# Patient Record
Sex: MALE | Race: WHITE | NOT HISPANIC OR LATINO | Employment: FULL TIME | URBAN - METROPOLITAN AREA
[De-identification: names, ages, dates, MRNs, and addresses within clinical notes are randomized per-mention and may not be internally consistent; named-entity substitution may affect disease eponyms.]

---

## 2019-11-08 ENCOUNTER — TELEPHONE (OUTPATIENT)
Dept: GASTROENTEROLOGY | Facility: CLINIC | Age: 34
End: 2019-11-08

## 2019-11-08 ENCOUNTER — OFFICE VISIT (OUTPATIENT)
Dept: GASTROENTEROLOGY | Facility: CLINIC | Age: 34
End: 2019-11-08
Payer: COMMERCIAL

## 2019-11-08 VITALS
BODY MASS INDEX: 32.07 KG/M2 | SYSTOLIC BLOOD PRESSURE: 150 MMHG | WEIGHT: 224 LBS | DIASTOLIC BLOOD PRESSURE: 100 MMHG | HEART RATE: 100 BPM | HEIGHT: 70 IN

## 2019-11-08 DIAGNOSIS — R10.33 PERIUMBILICAL ABDOMINAL PAIN: ICD-10-CM

## 2019-11-08 DIAGNOSIS — R14.0 BLOATING: ICD-10-CM

## 2019-11-08 DIAGNOSIS — K21.9 GASTROESOPHAGEAL REFLUX DISEASE, ESOPHAGITIS PRESENCE NOT SPECIFIED: ICD-10-CM

## 2019-11-08 DIAGNOSIS — R10.13 EPIGASTRIC PAIN: Primary | ICD-10-CM

## 2019-11-08 PROCEDURE — 99204 OFFICE O/P NEW MOD 45 MIN: CPT | Performed by: NURSE PRACTITIONER

## 2019-11-08 RX ORDER — LISINOPRIL 10 MG/1
10 TABLET ORAL DAILY
COMMUNITY
End: 2021-11-15 | Stop reason: SDUPTHER

## 2019-11-08 RX ORDER — FAMOTIDINE 20 MG/1
40 TABLET, FILM COATED ORAL DAILY
Qty: 30 TABLET | Refills: 2 | Status: SHIPPED | OUTPATIENT
Start: 2019-11-08 | End: 2021-04-14

## 2019-11-08 NOTE — PROGRESS NOTES
[facility] - Outpatient Consultation  Luis Fernando Cline 36 y o  male MRN: [id number]  Encounter: [id number]    ASSESSMENT AND PLAN:      1  Epigastric pain  Postprandial episodic epigastric pain that has progressed over the past several weeks  Exclude peptic ulcer disease, H pylori gastritis or biliary disease     - will arrange for an upper endoscopy  - famotidine (PEPCID) 20 mg tablet; Take 2 tablets (40 mg total) by mouth daily  Dispense: 30 tablet; Refill: 2    2  Periumbilical abdominal pain  Intermittent episodes of postprandial periumbilical abdominal pain with associated abdominal bloating over the past year and a half  Possibilities include peptic ulcer disease, biliary disease or less likely pancreatitis  Consider small intestine bacterial overgrowth  - if EGD negative for an explanation will order blood work to include CBC, CMP, amylase and lipase in addition to an abdominal ultrasound versus a CT scan of the abdomen      4  Gastroesophageal reflux disease, esophagitis presence not specified  Longstanding GERD for the past 3-4 years typically controlled on Zantac on an as-needed basis  However, he since has discontinued this medication several days ago  Exclude the presence of Curtis's esophagus, esophagitis or gastritis  - Pepcid 40 mg daily  - decrease consumption of ascitic, spicy, fried, fatty foods and caffeinated beverages  - will arrange for an upper endoscopy    Followup Appointment:  4 weeks  ______________________________________________________________________    Chief Complaint   Patient presents with    referred by family member     heartburn, bloating- feels better with eating        HPI:   Luis Fernando Cline is a 36y o  year old male who presents episodic issues with postprandial epigastric, periumbilical abdominal pain with associated bloating for the past 1 and half years    This most recent episode has been ongoing for the past several weeks and has progressed in intensity  Pain can last anywhere from a few hours to a few days  Occurs few hours after eating  Alleviated while lying flat on his back  Chronic heartburn for the past few years that had been alleviated with Zantac on an as-needed basis  The patient recently took himself off the Zantac  This has not necessary worsened is heartburn  Has gained 19 lb over the past 3 months  Reports that his stomach feels hard at times  Denies constipation or diarrhea  Denies melena rectal bleeding  Historical Information   Past Medical History:   Diagnosis Date    GERD (gastroesophageal reflux disease)     Hypertension      Past Surgical History:   Procedure Laterality Date    APPENDECTOMY       Social History     Substance and Sexual Activity   Alcohol Use Not Currently     Social History     Substance and Sexual Activity   Drug Use Not on file     Social History     Tobacco Use   Smoking Status Current Every Day Smoker    Packs/day: 1 50    Start date: 11/8/2001   Smokeless Tobacco Former User     Family History   Problem Relation Age of Onset    No Known Problems Mother     Heart disease Father     No Known Problems Brother        Meds/Allergies     Current Outpatient Medications:     lisinopril (ZESTRIL) 10 mg tablet    famotidine (PEPCID) 20 mg tablet    Allergies   Allergen Reactions    Ibuprofen     Tetracycline        PHYSICAL EXAM:    Pulse 100, height 5' 10" (1 778 m), weight 102 kg (224 lb)  Body mass index is 32 14 kg/m²  General Appearance: NAD, cooperative, alert  Eyes: Anicteric, PERRLA, EOMI  ENT:  Normocephalic, atraumatic, normal mucosa  Neck:  Supple, symmetrical, trachea midline,   Resp:  Clear to auscultation bilaterally; no rales, rhonchi or wheezing; respirations unlabored   CV:  S1 S2, Regular rate and rhythm; no murmur, rub, or gallop    GI:  Soft, non-tender, non-distended; normal bowel sounds; no masses, no organomegaly   Rectal: Deferred  Musculoskeletal: No cyanosis, clubbing or edema  Normal ROM  Skin:  No jaundice, rashes, or lesions   Heme/Lymph: No palpable cervical lymphadenopathy  Psych: Normal affect, good eye contact  Neuro: No gross deficits, AAOx3    Lab Results:   No results found for: WBC, HGB, HCT, MCV, PLT  No results found for: NA, K, CL, CO2, ANIONGAP, BUN, CREATININE, GLUCOSE, GLUF, CALCIUM, CORRECTEDCA, AST, ALT, ALKPHOS, PROT, BILITOT, EGFR  No results found for: IRON, TIBC, FERRITIN  No results found for: LIPASE    Radiology Results:   No results found  REVIEW OF SYSTEMS:    CONSTITUTIONAL: Denies any fever, chills, rigors, and weight loss  Positive weight gain  HEENT: No earache or tinnitus  Denies hearing loss or visual disturbances  CARDIOVASCULAR: No chest pain or palpitations  RESPIRATORY: Denies any cough, hemoptysis, shortness of breath or dyspnea on exertion  GASTROINTESTINAL: As noted in the History of Present Illness  GENITOURINARY: No problems with urination  Denies any hematuria or dysuria  NEUROLOGIC: No dizziness or vertigo, denies headaches  MUSCULOSKELETAL: Denies any muscle or joint pain  SKIN: Denies skin rashes or itching  ENDOCRINE: Denies excessive thirst  Denies intolerance to heat or cold  PSYCHOSOCIAL: Denies depression or anxiety  Denies any recent memory loss

## 2019-11-08 NOTE — PATIENT INSTRUCTIONS
Pepcid 40 mg daily  Will arrange for an upper endoscopy  If upper endoscopy negative for any abnormalities will pursue blood work and imaging studies

## 2019-11-13 DIAGNOSIS — K21.00 GASTROESOPHAGEAL REFLUX DISEASE WITH ESOPHAGITIS: Primary | ICD-10-CM

## 2019-11-13 PROCEDURE — 88305 TISSUE EXAM BY PATHOLOGIST: CPT | Performed by: PATHOLOGY

## 2019-11-13 PROCEDURE — 88342 IMHCHEM/IMCYTCHM 1ST ANTB: CPT | Performed by: PATHOLOGY

## 2019-11-13 RX ORDER — PANTOPRAZOLE SODIUM 40 MG/1
40 TABLET, DELAYED RELEASE ORAL DAILY
Qty: 30 TABLET | Refills: 1
Start: 2019-11-13 | End: 2020-01-10 | Stop reason: SDUPTHER

## 2019-11-13 NOTE — TELEPHONE ENCOUNTER
Pt had a procedure today and was given a script for Pantoprazole 40 mg daily, #30 with 1 refill  This is for documentation only

## 2019-11-14 ENCOUNTER — LAB REQUISITION (OUTPATIENT)
Dept: LAB | Facility: HOSPITAL | Age: 34
End: 2019-11-14
Payer: COMMERCIAL

## 2019-11-14 DIAGNOSIS — K20.90 ESOPHAGITIS, UNSPECIFIED: ICD-10-CM

## 2019-11-14 DIAGNOSIS — K21.9 GASTRO-ESOPHAGEAL REFLUX DISEASE WITHOUT ESOPHAGITIS: ICD-10-CM

## 2019-11-14 DIAGNOSIS — R10.13 EPIGASTRIC PAIN: ICD-10-CM

## 2019-11-21 ENCOUNTER — TELEPHONE (OUTPATIENT)
Dept: GASTROENTEROLOGY | Facility: CLINIC | Age: 34
End: 2019-11-21

## 2019-11-21 NOTE — TELEPHONE ENCOUNTER
Pts wife, Venu Munson, would like KK/Nursing to call her cell once egd path is reviewed, if pt does not answer his phone

## 2019-11-27 NOTE — TELEPHONE ENCOUNTER
Pt's wife, Lavell Uriarte, left [facility] [place] [place] asking for CB w/ bx of endo from 2 wks ago; asks for CB to his cell [phone] or to her [phone]

## 2019-11-29 NOTE — TELEPHONE ENCOUNTER
Discussed with wife  Patient is feeling better overall and Protonix  I explained that if symptoms flare he could add Pepcid at bedtime  He will keep his office visit next week

## 2020-01-10 DIAGNOSIS — K21.00 GASTROESOPHAGEAL REFLUX DISEASE WITH ESOPHAGITIS: ICD-10-CM

## 2020-01-10 RX ORDER — PANTOPRAZOLE SODIUM 40 MG/1
40 TABLET, DELAYED RELEASE ORAL DAILY
Qty: 30 TABLET | Refills: 5 | Status: SHIPPED | OUTPATIENT
Start: 2020-01-10 | End: 2020-04-26

## 2020-01-10 NOTE — TELEPHONE ENCOUNTER
Received a refill request from [facility] to refill pt's Pantoprazole 40 mg daily  Pt did have a procedure in November which noted to continue PPI with a 6 month EGD recall  Entered for your review and signature

## 2020-02-06 ENCOUNTER — TELEPHONE (OUTPATIENT)
Dept: GASTROENTEROLOGY | Facility: CLINIC | Age: 35
End: 2020-02-06

## 2020-02-06 NOTE — TELEPHONE ENCOUNTER
Pt's wife Alcira left  mssg stating over the last 2-3 days he has had a lot of pain in his stomach; knows that originally they said depending on results maybe would do a CT scan; asks for CB/questions if any new tests should be ordered?  [phone] (part of mssg not heard/child in background)

## 2020-02-06 NOTE — TELEPHONE ENCOUNTER
Spoke with patient and scheduled appt for tomorrow afternoon to discuss  Was also due for EGD f/u and had to cancel 2 appts

## 2020-02-07 ENCOUNTER — OFFICE VISIT (OUTPATIENT)
Dept: GASTROENTEROLOGY | Facility: CLINIC | Age: 35
End: 2020-02-07
Payer: COMMERCIAL

## 2020-02-07 ENCOUNTER — TELEPHONE (OUTPATIENT)
Dept: GASTROENTEROLOGY | Facility: CLINIC | Age: 35
End: 2020-02-07

## 2020-02-07 VITALS
BODY MASS INDEX: 31.64 KG/M2 | HEART RATE: 96 BPM | HEIGHT: 70 IN | SYSTOLIC BLOOD PRESSURE: 138 MMHG | WEIGHT: 221 LBS | DIASTOLIC BLOOD PRESSURE: 88 MMHG

## 2020-02-07 DIAGNOSIS — K22.711 BARRETT'S ESOPHAGUS WITH HIGH GRADE DYSPLASIA: ICD-10-CM

## 2020-02-07 DIAGNOSIS — K21.00 GASTROESOPHAGEAL REFLUX DISEASE WITH ESOPHAGITIS: ICD-10-CM

## 2020-02-07 DIAGNOSIS — R10.33 PERIUMBILICAL ABDOMINAL PAIN: Primary | ICD-10-CM

## 2020-02-07 DIAGNOSIS — R14.0 BLOATING: ICD-10-CM

## 2020-02-07 PROCEDURE — 99214 OFFICE O/P EST MOD 30 MIN: CPT | Performed by: NURSE PRACTITIONER

## 2020-02-07 NOTE — PATIENT INSTRUCTIONS
Check labs at [facility]   Ultrasound abdomen at [address]   Repeat Endoscopy at [address]  for now  Continue Protonix 40 mg daily     Follow up in 4-6 weeks        Gastroesophageal Reflux Disease   WHAT YOU NEED TO KNOW:   Gastroesophageal reflux occurs when acid and food in the stomach back up into the esophagus  Gastroesophageal reflux disease (GERD) is reflux that occurs more than twice a week for a few weeks  It usually causes heartburn and other symptoms  GERD can cause other health problems over time if it is not treated  DISCHARGE INSTRUCTIONS:   Return to the emergency department if:   · You feel full and cannot burp or vomit  · You have severe chest pain and sudden trouble breathing  · Your bowel movements are black, bloody, or tarry-looking  · Your vomit looks like coffee grounds or has blood in it  Contact your healthcare provider if:   · You vomit large amounts, or you vomit often  · You have trouble breathing after you vomit  · You have trouble swallowing, or pain with swallowing  · You are losing weight without trying  · Your symptoms get worse or do not improve with treatment  · You have questions or concerns about your condition or care  Medicines:   · Medicines  are used to decrease stomach acid  Medicine may also be used to help your lower esophageal sphincter and stomach contract (tighten) more  · Take your medicine as directed  Contact your healthcare provider if you think your medicine is not helping or if you have side effects  Tell him of her if you are allergic to any medicine  Keep a list of the medicines, vitamins, and herbs you take  Include the amounts, and when and why you take them  Bring the list or the pill bottles to follow-up visits  Carry your medicine list with you in case of an emergency  Manage GERD:   · Do not have foods or drinks that may increase heartburn    These include chocolate, peppermint, fried or fatty foods, drinks that contain caffeine, or carbonated drinks (soda)  Other foods include spicy foods, onions, tomatoes, and tomato-based foods  Do not have foods or drinks that can irritate your esophagus, such as citrus fruits, juices, and alcohol  · Do not eat large meals  When you eat a lot of food at one time, your stomach needs more acid to digest it  Eat 6 small meals each day instead of 3 large ones, and eat slowly  Do not eat meals 2 to 3 hours before bedtime  · Elevate the head of your bed  Place 6-inch blocks under the head of your bed frame  You may also use more than one pillow under your head and shoulders while you sleep  · Maintain a healthy weight  If you are overweight, weight loss may help relieve symptoms of GERD  · Do not smoke  Smoking weakens the lower esophageal sphincter and increases the risk of GERD  Ask your healthcare provider for information if you currently smoke and need help to quit  E-cigarettes or smokeless tobacco still contain nicotine  Talk to your healthcare provider before you use these products  · Do not wear clothing that is tight around your waist   Tight clothing can put pressure on your stomach and cause or worsen GERD symptoms  Follow up with your healthcare provider as directed:  Write down your questions so you remember to ask them during your visits  © 2017 [address] Information is for End User's use only and may not be sold, redistributed or otherwise used for commercial purposes  All illustrations and images included in CareNotes® are the copyrighted property of A D A M , Inc  or Juan Carlos Mcmullen  The above information is an  only  It is not intended as medical advice for individual conditions or treatments  Talk to your doctor, nurse or pharmacist before following any medical regimen to see if it is safe and effective for you

## 2020-02-07 NOTE — TELEPHONE ENCOUNTER
Patient was seen on 02/07/20 and an EGD was ordered  Patient was scheduled for 02/10/20, with Dr Eyal Mckinney, at the [facility]

## 2020-02-07 NOTE — PROGRESS NOTES
[address] [place] [address] [facility] - Outpatient Follow-up Note  Juvencio Childs 36 y o  male MRN: [id number]  Encounter: [id number]    ASSESSMENT AND PLAN:      1  Curtis's esophagus with high grade dysplasia  Last EGD was on 11/14/2019  This showed esophagitis and non-erosive gastritis and at the GE junction, focal high-grade dysplasia and intestinal metaplasia  This is consistent with Curtis's esophagus  A 6 month recall EGD was advised at that time      -will repeat EGD now at [address] [place] [address]   -if there is still evidence of high-grade dysplasia on EGD, will recommend that patient go to an [facility] for radiofrequency ablation  -continue Protonix 40 mg daily   -Patient is a 1 5 pack per day smoker (since age 14)  I  emphasized the importance of smoking cessation with patient  Recommended to try either Nicorette patch or gum and if not successful contact PCP for other alternatives  2  Gastroesophageal reflux disease with esophagitis  Reflux symptoms are well controlled on daily PPI  -continue Protonix 40 mg daily  -GERD lifestyle modifications discussed with patient  -encouraged smoking cessation     3  Periumbilical abdominal pain  4  Bloating  Has had postprandial periumbilical abdominal pain and bloating for the past 4 days  Differential diagnoses include atypical biliary disease       - will order US abdomen complete; Future  - will order Comprehensive metabolic panel; CBC and differential,  Amylase; Lipase   -encouraged bland diet for now       Followup Appointment: 4-6 weeks   ______________________________________________________________________    Chief Complaint   Patient presents with    Epigastric Pain     burning and gnawing sensation in stomach    Bloated     HPI:  Juvencio Childs is a 36y o  year old male who presents to the office today for worsening periumbilical abdominal pain and bloating   He had similar symptoms in November 2019 when he was seen here in our office and had been doing well up until 4 days ago  He states that pain is constant and is described as a burning sensation  He rates pain 8/10 and is associated with bloating that is worse throughout the day  Pain is also worse with palpation and postprandial   He denies any symptoms of heartburn, reflux, dysphagia, early satiety, nausea, vomiting, change in bowel habits, melena, rectal bleeding  He reports that his appetite is good and his weight is stable  (has gained several pounds over the past few weeks, unsure how much)  Historical Information   Past Medical History:   Diagnosis Date    GERD (gastroesophageal reflux disease)     Hypertension      Past Surgical History:   Procedure Laterality Date    APPENDECTOMY      UPPER GASTROINTESTINAL ENDOSCOPY  11/14/2019    esophagitis, non erosive gastritis, +focal high grade dysplasia and intestinal metaplasia  repeat EGD in 6 months      Social History     Substance and Sexual Activity   Alcohol Use Not Currently     Social History     Substance and Sexual Activity   Drug Use Not on file     Social History     Tobacco Use   Smoking Status Current Every Day Smoker    Packs/day: 1 50    Types: Cigarettes    Start date: 11/8/2001   Smokeless Tobacco Former User     Family History   Problem Relation Age of Onset    No Known Problems Mother     Heart disease Father     No Known Problems Brother     Colon cancer Neg Hx     Colon polyps Neg Hx          Current Outpatient Medications:     lisinopril (ZESTRIL) 10 mg tablet    pantoprazole (PROTONIX) 40 mg tablet    famotidine (PEPCID) 20 mg tablet  Allergies   Allergen Reactions    Ibuprofen     Tetracycline      Reviewed medications and allergies and updated as indicated    PHYSICAL EXAM:    Blood pressure 138/88, pulse 96, height 5' 10" (1 778 m), weight 100 kg (221 lb)  Body mass index is 31 71 kg/m²    General Appearance: NAD, cooperative, alert  Eyes: Anicteric, PERRLA, EOMI  ENT:  Normocephalic, atraumatic, normal mucosa  Neck:  Supple, symmetrical, trachea midline  Resp:  Clear to auscultation bilaterally; no rales, rhonchi or wheezing; respirations unlabored   CV:  S1 S2, Regular rate and rhythm; no murmur, rub, or gallop  GI:  Soft, mild left periumbilical ttp, no guarding, no rebound, non-distended; normal bowel sounds; no masses, no organomegaly   Rectal: Deferred  Musculoskeletal: No cyanosis, clubbing or edema  Normal ROM  Skin:  No jaundice, rashes, or lesions   Heme/Lymph: No palpable cervical lymphadenopathy  Psych: Normal affect, good eye contact  Neuro: No gross deficits, AAOx3    Lab Results:   No results found for: WBC, HGB, HCT, MCV, PLT  No results found for: NA, K, CL, CO2, ANIONGAP, BUN, CREATININE, GLUCOSE, GLUF, CALCIUM, CORRECTEDCA, AST, ALT, ALKPHOS, PROT, BILITOT, EGFR  No results found for: IRON, TIBC, FERRITIN  No results found for: LIPASE    Radiology Results:   No results found

## 2020-02-08 LAB
ALBUMIN SERPL-MCNC: 4.9 G/DL (ref 4–5)
ALBUMIN/GLOB SERPL: 2.5 {RATIO} (ref 1.2–2.2)
ALP SERPL-CCNC: 49 IU/L (ref 39–117)
ALT SERPL-CCNC: 23 IU/L (ref 0–44)
AMYLASE SERPL-CCNC: 59 U/L (ref 31–110)
AST SERPL-CCNC: 24 IU/L (ref 0–40)
BASOPHILS # BLD AUTO: 0.1 X10E3/UL (ref 0–0.2)
BASOPHILS NFR BLD AUTO: 1 %
BILIRUB SERPL-MCNC: <0.2 MG/DL (ref 0–1.2)
BUN SERPL-MCNC: 17 MG/DL (ref 6–20)
BUN/CREAT SERPL: 17 (ref 9–20)
CALCIUM SERPL-MCNC: 9.6 MG/DL (ref 8.7–10.2)
CHLORIDE SERPL-SCNC: 101 MMOL/L (ref 96–106)
CO2 SERPL-SCNC: 22 MMOL/L (ref 20–29)
CREAT SERPL-MCNC: 0.98 MG/DL (ref 0.76–1.27)
EOSINOPHIL # BLD AUTO: 0.3 X10E3/UL (ref 0–0.4)
EOSINOPHIL NFR BLD AUTO: 3 %
ERYTHROCYTE [DISTWIDTH] IN BLOOD BY AUTOMATED COUNT: 13 % (ref 11.6–15.4)
GLOBULIN SER-MCNC: 2 G/DL (ref 1.5–4.5)
GLUCOSE SERPL-MCNC: 92 MG/DL (ref 65–99)
HCT VFR BLD AUTO: 44.8 % (ref 37.5–51)
HGB BLD-MCNC: 15.5 G/DL (ref 13–17.7)
IMM GRANULOCYTES # BLD: 0 X10E3/UL (ref 0–0.1)
IMM GRANULOCYTES NFR BLD: 0 %
LIPASE SERPL-CCNC: 45 U/L (ref 13–78)
LYMPHOCYTES # BLD AUTO: 3.8 X10E3/UL (ref 0.7–3.1)
LYMPHOCYTES NFR BLD AUTO: 42 %
MCH RBC QN AUTO: 32.1 PG (ref 26.6–33)
MCHC RBC AUTO-ENTMCNC: 34.6 G/DL (ref 31.5–35.7)
MCV RBC AUTO: 93 FL (ref 79–97)
MONOCYTES # BLD AUTO: 0.4 X10E3/UL (ref 0.1–0.9)
MONOCYTES NFR BLD AUTO: 5 %
NEUTROPHILS # BLD AUTO: 4.4 X10E3/UL (ref 1.4–7)
NEUTROPHILS NFR BLD AUTO: 49 %
PLATELET # BLD AUTO: 262 X10E3/UL (ref 150–450)
POTASSIUM SERPL-SCNC: 4.1 MMOL/L (ref 3.5–5.2)
PROT SERPL-MCNC: 6.9 G/DL (ref 6–8.5)
RBC # BLD AUTO: 4.83 X10E6/UL (ref 4.14–5.8)
SL AMB EGFR AFRICAN AMERICAN: 117 ML/MIN/1.73
SL AMB EGFR NON AFRICAN AMERICAN: 101 ML/MIN/1.73
SODIUM SERPL-SCNC: 139 MMOL/L (ref 134–144)
WBC # BLD AUTO: 8.9 X10E3/UL (ref 3.4–10.8)

## 2020-02-10 PROCEDURE — 88305 TISSUE EXAM BY PATHOLOGIST: CPT | Performed by: PATHOLOGY

## 2020-02-11 ENCOUNTER — LAB REQUISITION (OUTPATIENT)
Dept: LAB | Facility: HOSPITAL | Age: 35
End: 2020-02-11
Payer: COMMERCIAL

## 2020-02-11 DIAGNOSIS — K22.711 BARRETT'S ESOPHAGUS WITH HIGH GRADE DYSPLASIA: ICD-10-CM

## 2020-02-11 DIAGNOSIS — K22.70 BARRETT'S ESOPHAGUS WITHOUT DYSPLASIA: ICD-10-CM

## 2020-02-11 DIAGNOSIS — R10.13 EPIGASTRIC PAIN: ICD-10-CM

## 2020-02-13 ENCOUNTER — HOSPITAL ENCOUNTER (OUTPATIENT)
Dept: ULTRASOUND IMAGING | Facility: HOSPITAL | Age: 35
Discharge: HOME | End: 2020-02-13
Payer: COMMERCIAL

## 2020-02-13 DIAGNOSIS — R10.33 PERIUMBILICAL ABDOMINAL PAIN: ICD-10-CM

## 2020-02-13 PROCEDURE — 76700 US EXAM ABDOM COMPLETE: CPT

## 2020-02-17 NOTE — RESULT ENCOUNTER NOTE
Reviewed US  Shows mild hepatomegaly and hepatic steatosis  Will need workup, can address at f/u visit on 3/10/20

## 2020-02-21 ENCOUNTER — TELEPHONE (OUTPATIENT)
Dept: GASTROENTEROLOGY | Facility: CLINIC | Age: 35
End: 2020-02-21

## 2020-02-21 NOTE — TELEPHONE ENCOUNTER
Pt's wife, Amarjit Cordoba, asks for [facility] [phone] w/ results of US last Thur at /notes might be in portal but hasn't looked/asks for CB

## 2020-02-21 NOTE — TELEPHONE ENCOUNTER
Would you mind contacting patient's wife Hany Scott? Your note states mild hepatomegaly and hepatic steatosis, will need workup  I am sure there will be questions involved regarding plan  Patient is scheduled for follow up with [facility] 3/10/20   Thanks

## 2020-04-26 DIAGNOSIS — K21.00 GASTROESOPHAGEAL REFLUX DISEASE WITH ESOPHAGITIS: ICD-10-CM

## 2020-04-26 RX ORDER — PANTOPRAZOLE SODIUM 40 MG/1
TABLET, DELAYED RELEASE ORAL
Qty: 90 TABLET | Refills: 0 | Status: SHIPPED | OUTPATIENT
Start: 2020-04-26 | End: 2020-07-20

## 2020-07-18 DIAGNOSIS — K21.00 GASTROESOPHAGEAL REFLUX DISEASE WITH ESOPHAGITIS: ICD-10-CM

## 2020-07-20 RX ORDER — PANTOPRAZOLE SODIUM 40 MG/1
TABLET, DELAYED RELEASE ORAL
Qty: 90 TABLET | Refills: 0 | Status: SHIPPED | OUTPATIENT
Start: 2020-07-20 | End: 2021-01-18

## 2020-10-26 ENCOUNTER — TELEPHONE (OUTPATIENT)
Dept: GASTROENTEROLOGY | Facility: CLINIC | Age: 35
End: 2020-10-26

## 2020-10-26 DIAGNOSIS — R14.0 BLOATING: Primary | ICD-10-CM

## 2020-10-26 RX ORDER — DICYCLOMINE HYDROCHLORIDE 10 MG/1
10 CAPSULE ORAL EVERY 6 HOURS PRN
Qty: 90 CAPSULE | Refills: 1 | Status: SHIPPED | OUTPATIENT
Start: 2020-10-26 | End: 2021-04-14

## 2021-01-17 DIAGNOSIS — K21.00 GASTROESOPHAGEAL REFLUX DISEASE WITH ESOPHAGITIS: ICD-10-CM

## 2021-01-18 RX ORDER — PANTOPRAZOLE SODIUM 40 MG/1
TABLET, DELAYED RELEASE ORAL
Qty: 90 TABLET | Refills: 0 | Status: SHIPPED | OUTPATIENT
Start: 2021-01-18 | End: 2021-04-20

## 2021-01-25 DIAGNOSIS — K76.0 HEPATIC STEATOSIS: Primary | ICD-10-CM

## 2021-03-13 ENCOUNTER — HOSPITAL ENCOUNTER (OUTPATIENT)
Dept: ULTRASOUND IMAGING | Facility: HOSPITAL | Age: 36
Discharge: HOME | End: 2021-03-13
Attending: INTERNAL MEDICINE
Payer: COMMERCIAL

## 2021-03-13 DIAGNOSIS — K76.0 HEPATIC STEATOSIS: ICD-10-CM

## 2021-03-13 PROCEDURE — 76705 ECHO EXAM OF ABDOMEN: CPT

## 2021-03-14 LAB
ALBUMIN SERPL-MCNC: 4.6 G/DL (ref 4–5)
ALP SERPL-CCNC: 52 IU/L (ref 39–117)
ALT SERPL-CCNC: 15 IU/L (ref 0–44)
AST SERPL-CCNC: 22 IU/L (ref 0–40)
BILIRUB DIRECT SERPL-MCNC: 0.07 MG/DL (ref 0–0.4)
BILIRUB SERPL-MCNC: 0.3 MG/DL (ref 0–1.2)
PROT SERPL-MCNC: 6.9 G/DL (ref 6–8.5)

## 2021-03-15 ENCOUNTER — TELEPHONE (OUTPATIENT)
Dept: GASTROENTEROLOGY | Facility: CLINIC | Age: 36
End: 2021-03-15

## 2021-03-15 NOTE — TELEPHONE ENCOUNTER
Pt's wife, Karie Roberts, requesting results of US done Sat at [facility]  CB to her [phone]

## 2021-03-15 NOTE — TELEPHONE ENCOUNTER
Rollo Number system has US listed as exam ended--no results noted  Pt's wife was notified still pending and we will keep checking

## 2021-03-17 NOTE — TELEPHONE ENCOUNTER
Called pt back and reviewed results of the [address]  (Was a 1 year follow up for hepatic steatosis and borderline hepatomegaly)  He is presently not having any pain but notes does come and go  Encouraged pt to make a follow up apt as was last seen 2/2020 for Curtis's, Gerd, abdominal pain and bloating  He is agreeable but will need to call back  Would you be able to address if a recall US and or labs are needed for Margarita's pt? You have reviewed labs already

## 2021-03-17 NOTE — TELEPHONE ENCOUNTER
Pt's wife left  mssg stating results of US came in and he has ques; req call to him at [phone]

## 2021-03-17 NOTE — TELEPHONE ENCOUNTER
Call placed to [facility] radiology to have study read

## 2021-04-01 ENCOUNTER — TELEPHONE (OUTPATIENT)
Dept: GASTROENTEROLOGY | Facility: CLINIC | Age: 36
End: 2021-04-01

## 2021-04-01 DIAGNOSIS — R14.0 BLOATING: ICD-10-CM

## 2021-04-01 DIAGNOSIS — R10.33 PERIUMBILICAL ABDOMINAL PAIN: ICD-10-CM

## 2021-04-01 DIAGNOSIS — R10.13 EPIGASTRIC PAIN: Primary | ICD-10-CM

## 2021-04-01 NOTE — TELEPHONE ENCOUNTER
Spoke with wife   She will have patient to call to set up breath test   Routing to  as Steven Rodriguez

## 2021-04-01 NOTE — TELEPHONE ENCOUNTER
Spoke to wife  Reports patient is still having abdominal pain  Is okay for a little while and then returns  Pain is R and L UQ under rib cage  Sometimes has nausea, but no vomiting  No fever/chills  Bloating present  BM usually okay, constipation at times  After eating lasagne last night started with pain  Is taking protonix  Advised to follow GERD precautions  Not using Bentyl  Advised to start using Bentyl as needed for pain to see if that helps  She is going to set up appt to be evaluated further  There had been a breath test ordered last October which was never completed  Should he complete the breath test?  Wife also wondering if any other studies need to be done

## 2021-04-01 NOTE — TELEPHONE ENCOUNTER
Pt's wife, Katelyn Faustin, req CB to her at [phone]  He still has stomach pain/asks for CB to her because he is working

## 2021-04-06 NOTE — TELEPHONE ENCOUNTER
I called patient, he reports constant throbbing centralized 4 inches above his belly button  It hurts when he pushes on the area  Doesn't seem to be related to eating or any particular activity  The pain comes on for 2-3 weeks then revolves, then it happens again in another couple of weeks  EGD/Colonoscopy I confirmed that he picked up his breath test yesterday; he plans to do it Friday when he is off work  Patient concerned it could be something more serious going on his gallbladder or pancreas  He had a recent [address]; besides his hepatomegaly there was no abnormality to account for his symptoms  His heartburn has resolved; continues to take Pantoprazole  Patient questioning if we can order a Catscan? I advised that he should proceed with Breath Test as recommended by Dr Ely Evans  Any further recommendations?

## 2021-04-06 NOTE — TELEPHONE ENCOUNTER
Pt left  mssg stating he thinks he has an appt in June; has been in a ridiculous amount of pain the last 2 wks; can't even do anything w/ his kids/is not a good quality of life; pain is constant throbbing pain 2-3 " above belly button; needs something done/can't seem to get an answer w/ this   cell # [phone]

## 2021-04-07 NOTE — TELEPHONE ENCOUNTER
Reviewed records  EGD last year for Curtis's  Office visit was recommended  One was scheduled and canceled, office follow-up was recommended multiple times since then (fatty liver etc)   okay to order CT abdomen/ pelvis but he NEEDS to keep office visit      also due for breath test   ER if worsens

## 2021-04-07 NOTE — TELEPHONE ENCOUNTER
Ct A/P approved via [employer]  Order ID: [id number]  Request Status: [address]: [facility]     Valid Dates: 04/07/2021 - 06/05/2021  Scheduled Date of Service: 04/07/2021     Servicing Provider:   [address] [address] [facility]  Phone: [phone]  Fax:  NPI: [id number]  TIN: [id number]    I spoke with patient, the plan is for him to perform breath test Friday  He will return samples to the office Friday afternoon  I notified him that Dr Clayton Everett was agreeable to order CT A/P, we received authorization from his insurance  I gave him the number to call & schedule for sometime next week  Reinforced that he needs to perform breath test before he has the Catscan due to the Barium  If symptoms worsen and pain is sever he should report to ED for further evaluation

## 2021-04-09 ENCOUNTER — CLINICAL SUPPORT (OUTPATIENT)
Dept: GASTROENTEROLOGY | Facility: CLINIC | Age: 36
End: 2021-04-09
Payer: COMMERCIAL

## 2021-04-09 DIAGNOSIS — R14.0 BLOATING: Primary | ICD-10-CM

## 2021-04-09 PROCEDURE — 91065 BREATH HYDROGEN/METHANE TEST: CPT | Performed by: INTERNAL MEDICINE

## 2021-04-09 NOTE — PROGRESS NOTES
[address] [facility]  [address] [place] [address]   [phone]    Bacterial Overgrowth Analytical Record    Mohini Whitney 30 y o  male MRN: [id number]      Date of Test: 4/9/2021    Substrate Given: Lactulose    Ordering Provider: Dr Regi Davenport Assistant: all    Symptoms: bloating    The patient presents for bacterial overgrowth testing  Patient fasted overnight  Baseline readings obtained  substrate was administered, and the patient drink without difficulty    Breath test performed every 20 min for a total of 3 hr    Sample Clock Time ppmH2 ppmCH4 Co2% Alli   Baseline   6:00 16 3 3 4 2 1 30   #1  20 minutes 6:20 13 3 3 7 1 48   #2  40 minutes 6:40 18 4 3 1 1 77   #3  60 minutes 7:00 25 3 4 1 1 34   #4  80 minutes 7:20 17 3 4 2 1 30   #5  100 minutes 7:40 12 2 3 3 1 66   #6  120 minutes 8:00 16 3 4 0 1 37   #7  140 minutes 8:20 14 4 4 0 1 37   #8  160 minutes 8:40 19 3 3 2 1 71   #9  180 minutes 9:00 23 4 3 9 1 41       Physician interpretation:  negative

## 2021-04-13 NOTE — TELEPHONE ENCOUNTER
Patient contacted office, he is scheduled for CT scan and requires labs prior (hx of HTN)  I called patient and reviewed that there are lab orders in to [facility] from 10/26/20 for cbc and cmp which were never completed and advised he let lab know to pull up orders  I advised he does not need to fast for our purposes

## 2021-04-14 ENCOUNTER — OFFICE VISIT (OUTPATIENT)
Dept: GASTROENTEROLOGY | Facility: CLINIC | Age: 36
End: 2021-04-14
Payer: COMMERCIAL

## 2021-04-14 VITALS
HEART RATE: 85 BPM | WEIGHT: 216 LBS | SYSTOLIC BLOOD PRESSURE: 140 MMHG | DIASTOLIC BLOOD PRESSURE: 86 MMHG | BODY MASS INDEX: 30.92 KG/M2 | HEIGHT: 70 IN

## 2021-04-14 DIAGNOSIS — K21.00 GASTROESOPHAGEAL REFLUX DISEASE WITH ESOPHAGITIS WITHOUT HEMORRHAGE: ICD-10-CM

## 2021-04-14 DIAGNOSIS — R14.0 BLOATING: Primary | ICD-10-CM

## 2021-04-14 DIAGNOSIS — K22.70 BARRETT'S ESOPHAGUS WITHOUT DYSPLASIA: ICD-10-CM

## 2021-04-14 PROCEDURE — 99214 OFFICE O/P EST MOD 30 MIN: CPT | Performed by: INTERNAL MEDICINE

## 2021-04-14 NOTE — PROGRESS NOTES
[id number] - Outpatient Follow-up Note  Camilo Yap 30 y o  male MRN: [id number]  Encounter: [id number]    ASSESSMENT AND PLAN:      1  Bloating  Chronic intermittent bloating and upper abdominal pain  Suspect functional dyspepsia  Workup has included EGD, ultrasound, and breath test   Trials of Bentyl unsuccessful  - hyoscyamine (LEVSIN/SL) 0 125 mg SL tablet; Take 1 tablet (0 125 mg total) by mouth every 6 (six) hours as needed for cramping  Dispense: 30 tablet; Refill: 10  -  Start probiotics daily  -  CT scan as ordered  -  If no better then would consider trial of amitriptyline    2  Gastroesophageal reflux disease with esophagitis without hemorrhage  3  Curtis's esophagus without dysplasia  Doing well taking Protonix 40 mg daily  Last EGD February 2020  Due for follow-up 2023      Followup Appointment:  3 months  ______________________________________________________________________    Chief Complaint   Patient presents with   Toy      HPI:   The patient is seen back in the office today  He has a known history of GERD and Curtis's esophagus and has been maintained on Protonix 40 mg daily  Last EGD was February of 2020  More recently he is having problems with intermittent gas/ bloat and upper abdominal pain  He reports about once every couple months he will develop a week or 2 of severe abdominal bloating and distention associated with crampy abdominal pain  He denies any nausea or vomiting  His bowels are normal   He feels like he passes gas it would help but he does not  A workup has included an EGD, ultrasound, and breath test which have been all negative  He has had a trial of Bentyl which has not helped  He is scheduled for a CT scan next week  His weight is down a few lb but that is deliberate    He denies any GI bleeding    Historical Information   Past Medical History:   Diagnosis Date    Curtis's esophagus without dysplasia 4/14/2021 EGD 2019 with questionable high-grade dysplasia  Follow-up EGD February 2020 without evidence of dysplasia  Three year recall him    GERD (gastroesophageal reflux disease)     Hypertension      Past Surgical History:   Procedure Laterality Date    APPENDECTOMY      UPPER GASTROINTESTINAL ENDOSCOPY  11/14/2019    esophagitis, non erosive gastritis, +Curtis's esophagus wtih high grade dysplasia      Social History     Substance and Sexual Activity   Alcohol Use Not Currently     Social History     Substance and Sexual Activity   Drug Use Not on file     Social History     Tobacco Use   Smoking Status Current Every Day Smoker    Packs/day: 1 50    Types: Cigarettes    Start date: 11/8/2001   Smokeless Tobacco Former User     Family History   Problem Relation Age of Onset    No Known Problems Mother     Heart disease Father     No Known Problems Brother     Colon cancer Neg Hx     Colon polyps Neg Hx          Current Outpatient Medications:     lisinopril (ZESTRIL) 10 mg tablet    pantoprazole (PROTONIX) 40 mg tablet    hyoscyamine (LEVSIN/SL) 0 125 mg SL tablet  Allergies   Allergen Reactions    Ibuprofen     Tetracycline      Reviewed medications and allergies and updated as indicated    PHYSICAL EXAM:    Blood pressure 140/86, pulse 85, height 5' 10" (1 778 m), weight 98 kg (216 lb)  Body mass index is 30 99 kg/m²  General Appearance: NAD, cooperative, alert  Eyes: Anicteric, PERRLA, EOMI  ENT:  Normocephalic, atraumatic, normal mucosa  Neck:  Supple, symmetrical, trachea midline  Resp:  Clear to auscultation bilaterally; no rales, rhonchi or wheezing; respirations unlabored   CV:  S1 S2, Regular rate and rhythm; no murmur, rub, or gallop  GI:  Soft, non-tender, non-distended; normal bowel sounds; no masses, no organomegaly   Rectal: Deferred  Musculoskeletal: No cyanosis, clubbing or edema  Normal ROM    Skin:  No jaundice, rashes, or lesions   Heme/Lymph: No palpable cervical lymphadenopathy  Psych: Normal affect, good eye contact  Neuro: No gross deficits, AAOx3    Lab Results:   Lab Results   Component Value Date    WBC 8 9 02/07/2020    HGB 15 5 02/07/2020    HCT 44 8 02/07/2020    MCV 93 02/07/2020     02/07/2020     Lab Results   Component Value Date    K 4 1 02/07/2020     02/07/2020    CO2 22 02/07/2020    BUN 17 02/07/2020    CREATININE 0 98 02/07/2020    AST 22 03/13/2021    ALT 15 03/13/2021       Lab Results   Component Value Date    LIPASE 45 02/07/2020       Radiology Results:   No results found

## 2021-04-14 NOTE — LETTER
April 14, 2021     [address]  [address]  [facility]    Patient: Amita Garcia   YOB: 1985   Date of Visit: 4/14/2021       Dear Dr Dat Plasencia: Thank you for referring Efraín Betancourt to me for evaluation  Below are my notes for this consultation  If you have questions, please do not hesitate to call me  I look forward to following your patient along with you  Sincerely,        Sharad Guerra MD        CC: No Recipients  Sharad Guerra MD  4/14/2021  2:34 PM  Sign when Signing Visit  [facility] - Outpatient Follow-up Note  Amita Garcia 30 y o  male MRN: [id number]  Encounter: [id number]    ASSESSMENT AND PLAN:      1  Bloating  Chronic intermittent bloating and upper abdominal pain  Suspect functional dyspepsia  Workup has included EGD, ultrasound, and breath test   Trials of Bentyl unsuccessful  - hyoscyamine (LEVSIN/SL) 0 125 mg SL tablet; Take 1 tablet (0 125 mg total) by mouth every 6 (six) hours as needed for cramping  Dispense: 30 tablet; Refill: 10  -  Start probiotics daily  -  CT scan as ordered  -  If no better then would consider trial of amitriptyline    2  Gastroesophageal reflux disease with esophagitis without hemorrhage  3  Curtis's esophagus without dysplasia  Doing well taking Protonix 40 mg daily  Last EGD February 2020  Due for follow-up 2023      Followup Appointment:  3 months  ______________________________________________________________________    Chief Complaint   Patient presents with   Zaheer Whaley     HPI:   The patient is seen back in the office today  He has a known history of GERD and Curtis's esophagus and has been maintained on Protonix 40 mg daily  Last EGD was February of 2020  More recently he is having problems with intermittent gas/ bloat and upper abdominal pain    He reports about once every couple months he will develop a week or 2 of severe abdominal bloating and distention associated with crampy abdominal pain  He denies any nausea or vomiting  His bowels are normal   He feels like he passes gas it would help but he does not  A workup has included an EGD, ultrasound, and breath test which have been all negative  He has had a trial of Bentyl which has not helped  He is scheduled for a CT scan next week  His weight is down a few lb but that is deliberate  He denies any GI bleeding    Historical Information   Past Medical History:   Diagnosis Date    Curtis's esophagus without dysplasia 4/14/2021    EGD 2019 with questionable high-grade dysplasia  Follow-up EGD February 2020 without evidence of dysplasia  Three year recall him    GERD (gastroesophageal reflux disease)     Hypertension      Past Surgical History:   Procedure Laterality Date    APPENDECTOMY      UPPER GASTROINTESTINAL ENDOSCOPY  11/14/2019    esophagitis, non erosive gastritis, +Curtis's esophagus wtih high grade dysplasia      Social History     Substance and Sexual Activity   Alcohol Use Not Currently     Social History     Substance and Sexual Activity   Drug Use Not on file     Social History     Tobacco Use   Smoking Status Current Every Day Smoker    Packs/day: 1 50    Types: Cigarettes    Start date: 11/8/2001   Smokeless Tobacco Former User     Family History   Problem Relation Age of Onset    No Known Problems Mother     Heart disease Father     No Known Problems Brother     Colon cancer Neg Hx     Colon polyps Neg Hx          Current Outpatient Medications:     lisinopril (ZESTRIL) 10 mg tablet    pantoprazole (PROTONIX) 40 mg tablet    hyoscyamine (LEVSIN/SL) 0 125 mg SL tablet  Allergies   Allergen Reactions    Ibuprofen     Tetracycline      Reviewed medications and allergies and updated as indicated    PHYSICAL EXAM:    Blood pressure 140/86, pulse 85, height 5' 10" (1 778 m), weight 98 kg (216 lb)  Body mass index is 30 99 kg/m²    General Appearance: NAD, cooperative, alert  Eyes: Anicteric, PERRLA, EOMI  ENT:  Normocephalic, atraumatic, normal mucosa  Neck:  Supple, symmetrical, trachea midline  Resp:  Clear to auscultation bilaterally; no rales, rhonchi or wheezing; respirations unlabored   CV:  S1 S2, Regular rate and rhythm; no murmur, rub, or gallop  GI:  Soft, non-tender, non-distended; normal bowel sounds; no masses, no organomegaly   Rectal: Deferred  Musculoskeletal: No cyanosis, clubbing or edema  Normal ROM  Skin:  No jaundice, rashes, or lesions   Heme/Lymph: No palpable cervical lymphadenopathy  Psych: Normal affect, good eye contact  Neuro: No gross deficits, AAOx3    Lab Results:   Lab Results   Component Value Date    WBC 8 9 02/07/2020    HGB 15 5 02/07/2020    HCT 44 8 02/07/2020    MCV 93 02/07/2020     02/07/2020     Lab Results   Component Value Date    K 4 1 02/07/2020     02/07/2020    CO2 22 02/07/2020    BUN 17 02/07/2020    CREATININE 0 98 02/07/2020    AST 22 03/13/2021    ALT 15 03/13/2021       Lab Results   Component Value Date    LIPASE 45 02/07/2020       Radiology Results:   No results found

## 2021-04-20 DIAGNOSIS — K21.00 GASTROESOPHAGEAL REFLUX DISEASE WITH ESOPHAGITIS: ICD-10-CM

## 2021-04-20 RX ORDER — PANTOPRAZOLE SODIUM 40 MG/1
TABLET, DELAYED RELEASE ORAL
Qty: 90 TABLET | Refills: 0 | Status: SHIPPED | OUTPATIENT
Start: 2021-04-20 | End: 2021-07-28

## 2021-07-28 DIAGNOSIS — K21.00 GASTROESOPHAGEAL REFLUX DISEASE WITH ESOPHAGITIS: ICD-10-CM

## 2021-07-28 RX ORDER — PANTOPRAZOLE SODIUM 40 MG/1
TABLET, DELAYED RELEASE ORAL
Qty: 90 TABLET | Refills: 0 | Status: SHIPPED | OUTPATIENT
Start: 2021-07-28 | End: 2021-11-08

## 2021-08-13 ENCOUNTER — TELEPHONE (OUTPATIENT)
Dept: GASTROENTEROLOGY | Facility: CLINIC | Age: 36
End: 2021-08-13

## 2021-08-13 NOTE — TELEPHONE ENCOUNTER
Pt requesting refill of Pantoprazole; takes one daily, to [facility]  If Fany Arredondo [phone]  Called Pt back and noted e-script went to pharm 7/28 to refill #90/noted no refills and at last OV he was to f/u in 3 mos  Transf'd call to sched f/u appt  Pt will CB if pharmacy does not have script

## 2021-11-06 DIAGNOSIS — K21.00 GASTROESOPHAGEAL REFLUX DISEASE WITH ESOPHAGITIS: ICD-10-CM

## 2021-11-08 RX ORDER — PANTOPRAZOLE SODIUM 40 MG/1
TABLET, DELAYED RELEASE ORAL
Qty: 90 TABLET | Refills: 0 | Status: SHIPPED | OUTPATIENT
Start: 2021-11-08 | End: 2021-11-15 | Stop reason: SDUPTHER

## 2021-11-15 ENCOUNTER — OFFICE VISIT (OUTPATIENT)
Dept: FAMILY MEDICINE CLINIC | Facility: CLINIC | Age: 36
End: 2021-11-15
Payer: COMMERCIAL

## 2021-11-15 VITALS
HEART RATE: 79 BPM | BODY MASS INDEX: 31.7 KG/M2 | HEIGHT: 69 IN | DIASTOLIC BLOOD PRESSURE: 70 MMHG | OXYGEN SATURATION: 98 % | WEIGHT: 214 LBS | SYSTOLIC BLOOD PRESSURE: 120 MMHG | TEMPERATURE: 97.6 F

## 2021-11-15 DIAGNOSIS — K21.00 GASTROESOPHAGEAL REFLUX DISEASE WITH ESOPHAGITIS: ICD-10-CM

## 2021-11-15 DIAGNOSIS — Z00.00 WELLNESS EXAMINATION: Primary | ICD-10-CM

## 2021-11-15 DIAGNOSIS — I10 PRIMARY HYPERTENSION: ICD-10-CM

## 2021-11-15 DIAGNOSIS — E78.2 MIXED HYPERLIPIDEMIA: ICD-10-CM

## 2021-11-15 DIAGNOSIS — R53.83 OTHER FATIGUE: ICD-10-CM

## 2021-11-15 PROCEDURE — 4004F PT TOBACCO SCREEN RCVD TLK: CPT | Performed by: FAMILY MEDICINE

## 2021-11-15 PROCEDURE — 3725F SCREEN DEPRESSION PERFORMED: CPT | Performed by: FAMILY MEDICINE

## 2021-11-15 PROCEDURE — 99395 PREV VISIT EST AGE 18-39: CPT | Performed by: FAMILY MEDICINE

## 2021-11-15 PROCEDURE — 3008F BODY MASS INDEX DOCD: CPT | Performed by: FAMILY MEDICINE

## 2021-11-15 RX ORDER — LISINOPRIL 20 MG/1
20 TABLET ORAL DAILY
Qty: 90 TABLET | Refills: 3 | Status: SHIPPED | OUTPATIENT
Start: 2021-11-15

## 2021-11-15 RX ORDER — PANTOPRAZOLE SODIUM 40 MG/1
40 TABLET, DELAYED RELEASE ORAL DAILY
Qty: 90 TABLET | Refills: 3 | Status: SHIPPED | OUTPATIENT
Start: 2021-11-15 | End: 2022-01-25 | Stop reason: SDUPTHER

## 2021-11-28 LAB
ALBUMIN SERPL-MCNC: 4.9 G/DL (ref 4–5)
ALBUMIN/GLOB SERPL: 2.3 {RATIO} (ref 1.2–2.2)
ALP SERPL-CCNC: 51 IU/L (ref 44–121)
ALT SERPL-CCNC: 18 IU/L (ref 0–44)
AST SERPL-CCNC: 26 IU/L (ref 0–40)
BILIRUB SERPL-MCNC: 0.2 MG/DL (ref 0–1.2)
BUN SERPL-MCNC: 18 MG/DL (ref 6–20)
BUN/CREAT SERPL: 16 (ref 9–20)
CALCIUM SERPL-MCNC: 10 MG/DL (ref 8.7–10.2)
CHLORIDE SERPL-SCNC: 102 MMOL/L (ref 96–106)
CHOLEST SERPL-MCNC: 233 MG/DL (ref 100–199)
CO2 SERPL-SCNC: 24 MMOL/L (ref 20–29)
CREAT SERPL-MCNC: 1.15 MG/DL (ref 0.76–1.27)
GLOBULIN SER-MCNC: 2.1 G/DL (ref 1.5–4.5)
GLUCOSE SERPL-MCNC: 100 MG/DL (ref 65–99)
HDLC SERPL-MCNC: 40 MG/DL
IGG SERPL-MCNC: 618 MG/DL (ref 603–1613)
IGG1 SER-MCNC: 341 MG/DL (ref 248–810)
IGG2 SER-MCNC: 190 MG/DL (ref 130–555)
IGG3 SER-MCNC: 37 MG/DL (ref 15–102)
IGG4 SER-MCNC: 17 MG/DL (ref 2–96)
LDLC SERPL CALC-MCNC: 167 MG/DL (ref 0–99)
LDLC/HDLC SERPL: 4.2 RATIO (ref 0–3.6)
POTASSIUM SERPL-SCNC: 4.6 MMOL/L (ref 3.5–5.2)
PROT SERPL-MCNC: 7 G/DL (ref 6–8.5)
SL AMB EGFR AFRICAN AMERICAN: 95 ML/MIN/1.73
SL AMB EGFR NON AFRICAN AMERICAN: 82 ML/MIN/1.73
SL AMB VLDL CHOLESTEROL CALC: 26 MG/DL (ref 5–40)
SODIUM SERPL-SCNC: 139 MMOL/L (ref 134–144)
TRIGL SERPL-MCNC: 144 MG/DL (ref 0–149)

## 2021-11-30 ENCOUNTER — TELEPHONE (OUTPATIENT)
Dept: FAMILY MEDICINE CLINIC | Facility: CLINIC | Age: 36
End: 2021-11-30

## 2021-12-27 ENCOUNTER — OFFICE VISIT (OUTPATIENT)
Dept: FAMILY MEDICINE CLINIC | Facility: CLINIC | Age: 36
End: 2021-12-27
Payer: COMMERCIAL

## 2021-12-27 DIAGNOSIS — I10 PRIMARY HYPERTENSION: Primary | ICD-10-CM

## 2021-12-27 PROCEDURE — 99214 OFFICE O/P EST MOD 30 MIN: CPT | Performed by: FAMILY MEDICINE

## 2021-12-27 PROCEDURE — 4004F PT TOBACCO SCREEN RCVD TLK: CPT | Performed by: FAMILY MEDICINE

## 2022-01-25 ENCOUNTER — TELEMEDICINE (OUTPATIENT)
Dept: GASTROENTEROLOGY | Facility: CLINIC | Age: 37
End: 2022-01-25
Payer: COMMERCIAL

## 2022-01-25 VITALS — WEIGHT: 214 LBS | HEIGHT: 69 IN | BODY MASS INDEX: 31.7 KG/M2

## 2022-01-25 DIAGNOSIS — K21.00 GASTROESOPHAGEAL REFLUX DISEASE WITH ESOPHAGITIS WITHOUT HEMORRHAGE: Primary | ICD-10-CM

## 2022-01-25 DIAGNOSIS — K22.70 BARRETT'S ESOPHAGUS WITHOUT DYSPLASIA: ICD-10-CM

## 2022-01-25 DIAGNOSIS — K21.00 GASTROESOPHAGEAL REFLUX DISEASE WITH ESOPHAGITIS: ICD-10-CM

## 2022-01-25 DIAGNOSIS — R14.0 BLOATING: ICD-10-CM

## 2022-01-25 PROCEDURE — 4004F PT TOBACCO SCREEN RCVD TLK: CPT | Performed by: INTERNAL MEDICINE

## 2022-01-25 PROCEDURE — 3008F BODY MASS INDEX DOCD: CPT | Performed by: INTERNAL MEDICINE

## 2022-01-25 PROCEDURE — 99213 OFFICE O/P EST LOW 20 MIN: CPT | Performed by: INTERNAL MEDICINE

## 2022-01-25 RX ORDER — PANTOPRAZOLE SODIUM 40 MG/1
40 TABLET, DELAYED RELEASE ORAL DAILY
Qty: 90 TABLET | Refills: 12 | Status: SHIPPED | OUTPATIENT
Start: 2022-01-25 | End: 2022-02-01 | Stop reason: SDUPTHER

## 2022-01-25 NOTE — LETTER
January 25, 2022     Delano Parrish MD  [facility]    Patient: Samson Ludwig   YOB: 1985   Date of Visit: 1/25/2022       Dear Dr Benito: Thank you for referring Mona Mei to me for evaluation  Below are my notes for this consultation  If you have questions, please do not hesitate to call me  I look forward to following your patient along with you  Sincerely,        Pita Kearns MD        CC: No Recipients  Pita Kearns MD  1/25/2022  9:24 AM  Sign when Signing Visit    Virtual Regular Visit    Verification of patient location:    Patient is located in the following state in which I hold an active license [place]      Assessment/Plan:  1  GERD  2  Curtis's esophagus  Doing well on Protonix 40 mg daily  Last EGD February 2020  - continue Protonix 40mg daily  - EGD February 2023    3  Gas and bloat  Probably functional and diet related more than anything  - continue probiotics  - food diary to see if we can identify trigger foods      Problem List Items Addressed This Visit        Digestive    GERD (gastroesophageal reflux disease) - Primary    Relevant Medications    pantoprazole (PROTONIX) 40 mg tablet    Curtis's esophagus without dysplasia    Relevant Medications    pantoprazole (PROTONIX) 40 mg tablet       Other    Bloating      Other Visit Diagnoses     Gastroesophageal reflux disease with esophagitis        Relevant Medications    pantoprazole (PROTONIX) 40 mg tablet               Reason for visit is   Chief Complaint   Patient presents with    Medication Refill     Pantoprazole    Virtual Regular Visit        Encounter provider Pita Kearns MD    Provider located at [address] [address] [phone]      Recent Visits  No visits were found meeting these conditions    Showing recent visits within past 7 days and meeting all other requirements  Today's Visits  Date Type Provider Dept   01/25/22 Telemedicine Radha Aaron MD [facility]   Showing today's visits and meeting all other requirements  Future Appointments  No visits were found meeting these conditions  Showing future appointments within next 150 days and meeting all other requirements       The patient was identified by name and date of birth  Gonzalez Harris was informed that this is a telemedicine visit and that the visit is being conducted through [facility] and patient was informed this is a secure, HIPAA-complaint platform  He agrees to proceed     My office door was closed  No one else was in the room  He acknowledged consent and understanding of privacy and security of the video platform  The patient has agreed to participate and understands they can discontinue the visit at any time  Patient is aware this is a billable service  Subjective  Gonzalez Harris is a 29 y o  male with history of GERD and Curtis's esophagus who is seen in telemedicine follow-up  From a reflux standpoint he is doing well  Continues take Protonix 40 mg once a day  With this he can eat and drink whatever he wants  He does admit that if he misses a dose of the Protonix he has significant reflux symptoms  His last endoscopy was February 2020  He is due for a follow-up endoscopy in 2023  He more recently has been worked up for upper abdominal bloating and foul-smelling flatus  A workup has included a negative EGD, ultrasound, and breath test   He is taking probiotics 3 to 4 times a week  He reports that his symptoms have improved  He reports 1 episode of that lasted for a day or so following eating a cheesesteak with pepperoni and contreras  He does also report that anxiety seems to trigger some of his bloating  He denies any diarrhea  Denies any GI bleeding  His weight is stable        HPI     Past Medical History:   Diagnosis Date    Curtis's esophagus without dysplasia 4/14/2021    EGD 2019 with questionable high-grade dysplasia  Follow-up EGD February 2020 without evidence of dysplasia  Three year recall him    GERD (gastroesophageal reflux disease)     Hypertension        Past Surgical History:   Procedure Laterality Date    APPENDECTOMY      UPPER GASTROINTESTINAL ENDOSCOPY  11/14/2019    esophagitis, non erosive gastritis, +Curtis's esophagus wtih high grade dysplasia        Current Outpatient Medications   Medication Sig Dispense Refill    lisinopril (ZESTRIL) 20 mg tablet Take 1 tablet (20 mg total) by mouth daily 90 tablet 3    pantoprazole (PROTONIX) 40 mg tablet Take 1 tablet (40 mg total) by mouth daily 90 tablet 12     No current facility-administered medications for this visit  Allergies   Allergen Reactions    Ibuprofen     Other Other (See Comments)     Seasonal allergies      Tetracycline      Social History     Tobacco Use    Smoking status: Current Every Day Smoker     Packs/day: 1 50     Types: Cigarettes     Start date: 11/8/2001    Smokeless tobacco: Former User   Vaping Use    Vaping Use: Never used   Substance Use Topics    Alcohol use: Not Currently    Drug use: Never     Family History   Problem Relation Age of Onset    No Known Problems Mother     Heart disease Father     No Known Problems Brother     Colon cancer Neg Hx     Colon polyps Neg Hx        Review of Systems per HPI    Video Exam    Vitals:    01/25/22 0856   Weight: 97 1 kg (214 lb)   Height: 5' 9" (1 753 m)       Physical Exam   General:  Well-nourished  Well-developed  No acute distress   HEENT:  Within normal limits   Eyes:  Nonicteric   Neck:  Supple  No JVD   Chest:  Normal respiratory pattern  No wheezing   Abdomen:  Nondistended   Extremities:  No edema   Skin:  No rashes or lesions   Neurologic:  Alert and orient x3    Nonfocal   Psychiatric:  Normal affect       I spent 25 minutes directly with the patient during this visit    VIRTUAL VISIT Tamiko Panda verbally agrees to participate in [facility]  Pt is aware that [facility] could be limited without vital signs or the ability to perform a full hands-on physical Fluvanna Ends understands he or the provider may request at any time to terminate the video visit and request the patient to seek care or treatment in person

## 2022-01-25 NOTE — PROGRESS NOTES
Virtual Regular Visit    Verification of patient location:    Patient is located in the following state in which I hold an active license [place]      Assessment/Plan:  1  GERD  2  Curtis's esophagus  Doing well on Protonix 40 mg daily  Last EGD February 2020  - continue Protonix 40mg daily  - EGD February 2023    3  Gas and bloat  Probably functional and diet related more than anything  - continue probiotics  - food diary to see if we can identify trigger foods      Problem List Items Addressed This Visit        Digestive    GERD (gastroesophageal reflux disease) - Primary    Relevant Medications    pantoprazole (PROTONIX) 40 mg tablet    Curtis's esophagus without dysplasia    Relevant Medications    pantoprazole (PROTONIX) 40 mg tablet       Other    Bloating      Other Visit Diagnoses     Gastroesophageal reflux disease with esophagitis        Relevant Medications    pantoprazole (PROTONIX) 40 mg tablet               Reason for visit is   Chief Complaint   Patient presents with    Medication Refill     Pantoprazole    Virtual Regular Visit        Encounter provider Burke Gtz MD    Provider located at [address] [address] [phone]      Recent Visits  No visits were found meeting these conditions  Showing recent visits within past 7 days and meeting all other requirements  Today's Visits  Date Type Provider Dept   01/25/22 Telemedicine Marina Elder MD [facility]   Showing today's visits and meeting all other requirements  Future Appointments  No visits were found meeting these conditions  Showing future appointments within next 150 days and meeting all other requirements       The patient was identified by name and date of birth   Karolyn Coleman was informed that this is a telemedicine visit and that the visit is being conducted through [address] and patient was informed this is a secure, HIPAA-complaint platform  He agrees to proceed     My office door was closed  No one else was in the room  He acknowledged consent and understanding of privacy and security of the video platform  The patient has agreed to participate and understands they can discontinue the visit at any time  Patient is aware this is a billable service  Subjective  Mitul Rosario is a 29 y o  male with history of GERD and Curtis's esophagus who is seen in telemedicine follow-up  From a reflux standpoint he is doing well  Continues take Protonix 40 mg once a day  With this he can eat and drink whatever he wants  He does admit that if he misses a dose of the Protonix he has significant reflux symptoms  His last endoscopy was February 2020  He is due for a follow-up endoscopy in 2023  He more recently has been worked up for upper abdominal bloating and foul-smelling flatus  A workup has included a negative EGD, ultrasound, and breath test   He is taking probiotics 3 to 4 times a week  He reports that his symptoms have improved  He reports 1 episode of that lasted for a day or so following eating a cheesesteak with pepperoni and contreras  He does also report that anxiety seems to trigger some of his bloating  He denies any diarrhea  Denies any GI bleeding  His weight is stable  HPI     Past Medical History:   Diagnosis Date    Curtis's esophagus without dysplasia 4/14/2021    EGD 2019 with questionable high-grade dysplasia  Follow-up EGD February 2020 without evidence of dysplasia    Three year recall him    GERD (gastroesophageal reflux disease)     Hypertension        Past Surgical History:   Procedure Laterality Date    APPENDECTOMY      UPPER GASTROINTESTINAL ENDOSCOPY  11/14/2019    esophagitis, non erosive gastritis, +Curtis's esophagus wtih high grade dysplasia        Current Outpatient Medications   Medication Sig Dispense Refill    lisinopril (ZESTRIL) 20 mg tablet Take 1 tablet (20 mg total) by mouth daily 90 tablet 3    pantoprazole (PROTONIX) 40 mg tablet Take 1 tablet (40 mg total) by mouth daily 90 tablet 12     No current facility-administered medications for this visit  Allergies   Allergen Reactions    Ibuprofen     Other Other (See Comments)     Seasonal allergies      Tetracycline      Social History     Tobacco Use    Smoking status: Current Every Day Smoker     Packs/day: 1 50     Types: Cigarettes     Start date: 11/8/2001    Smokeless tobacco: Former User   Vaping Use    Vaping Use: Never used   Substance Use Topics    Alcohol use: Not Currently    Drug use: Never     Family History   Problem Relation Age of Onset    No Known Problems Mother     Heart disease Father     No Known Problems Brother     Colon cancer Neg Hx     Colon polyps Neg Hx        Review of Systems per HPI    Video Exam    Vitals:    01/25/22 0856   Weight: 97 1 kg (214 lb)   Height: 5' 9" (1 753 m)       Physical Exam   General:  Well-nourished  Well-developed  No acute distress   HEENT:  Within normal limits   Eyes:  Nonicteric   Neck:  Supple  No JVD   Chest:  Normal respiratory pattern  No wheezing   Abdomen:  Nondistended   Extremities:  No edema   Skin:  No rashes or lesions   Neurologic:  Alert and orient x3  Nonfocal   Psychiatric:  Normal affect       I spent 25 minutes directly with the patient during this visit    [address] [place] [address] verbally agrees to participate in [employer]  Pt is aware that [employer] could be limited without vital signs or the ability to perform a full hands-on physical Adrianna Babinski understands he or the provider may request at any time to terminate the video visit and request the patient to seek care or treatment in person

## 2022-01-31 DIAGNOSIS — K21.00 GASTROESOPHAGEAL REFLUX DISEASE WITH ESOPHAGITIS: ICD-10-CM

## 2022-01-31 NOTE — TELEPHONE ENCOUNTER
Pt's wife, Cherri Dickinson, states Pura is cheaper; request Rx for Pantoprazole to [facility] at [address]; takes one daily; requests 3-mo supply  If ques # [phone]

## 2022-02-01 RX ORDER — PANTOPRAZOLE SODIUM 40 MG/1
40 TABLET, DELAYED RELEASE ORAL DAILY
Qty: 90 TABLET | Refills: 3 | Status: SHIPPED | OUTPATIENT
Start: 2022-02-01 | End: 2022-06-16 | Stop reason: SDUPTHER

## 2022-02-01 NOTE — TELEPHONE ENCOUNTER
Medication entered for review and signature  Pt was seen 1/25/22 and noted to continue Pantoprazole 40 mg daily

## 2022-03-15 ENCOUNTER — TELEPHONE (OUTPATIENT)
Dept: GASTROENTEROLOGY | Facility: CLINIC | Age: 37
End: 2022-03-15

## 2022-03-15 ENCOUNTER — OFFICE VISIT (OUTPATIENT)
Dept: GASTROENTEROLOGY | Facility: CLINIC | Age: 37
End: 2022-03-15
Payer: COMMERCIAL

## 2022-03-15 VITALS
BODY MASS INDEX: 31.4 KG/M2 | DIASTOLIC BLOOD PRESSURE: 88 MMHG | HEART RATE: 100 BPM | WEIGHT: 212 LBS | HEIGHT: 69 IN | SYSTOLIC BLOOD PRESSURE: 130 MMHG

## 2022-03-15 DIAGNOSIS — R10.13 EPIGASTRIC PAIN: Primary | ICD-10-CM

## 2022-03-15 DIAGNOSIS — R14.0 BLOATING: ICD-10-CM

## 2022-03-15 DIAGNOSIS — K22.70 BARRETT'S ESOPHAGUS WITHOUT DYSPLASIA: ICD-10-CM

## 2022-03-15 DIAGNOSIS — K21.00 GASTROESOPHAGEAL REFLUX DISEASE WITH ESOPHAGITIS WITHOUT HEMORRHAGE: ICD-10-CM

## 2022-03-15 PROCEDURE — 99214 OFFICE O/P EST MOD 30 MIN: CPT | Performed by: INTERNAL MEDICINE

## 2022-03-15 PROCEDURE — 3008F BODY MASS INDEX DOCD: CPT | Performed by: INTERNAL MEDICINE

## 2022-03-15 PROCEDURE — 4004F PT TOBACCO SCREEN RCVD TLK: CPT | Performed by: INTERNAL MEDICINE

## 2022-03-15 RX ORDER — AMITRIPTYLINE HYDROCHLORIDE 10 MG/1
20 TABLET, FILM COATED ORAL
Qty: 60 TABLET | Refills: 5 | Status: SHIPPED | OUTPATIENT
Start: 2022-03-15 | End: 2022-06-10 | Stop reason: SDUPTHER

## 2022-03-15 NOTE — TELEPHONE ENCOUNTER
I spoke with wife, Inga Diane continues with excruciating abd pain, bloating, gas, constipation  I offered him a visit today with Dr Shanta Saravia at 3:30 pm, Inga Contreras accepted

## 2022-03-15 NOTE — PROGRESS NOTES
[id number] - Outpatient Follow-up Note  Kellie Burdick 29 y o  male MRN: [id number]  Encounter: [id number]    ASSESSMENT AND PLAN:      1  Epigastric pain  2  Bloating  Persistent daily abdominal pain with bloating and foul-smelling flatulence  Previous workup has included ultrasound, EGD, and breath test   Suspect functional related  - CT abdomen w contrast; Future  - amitriptyline (ELAVIL) 10 mg tablet; Take 2 tablets (20 mg total) by mouth daily at bedtime  Dispense: 60 tablet; Refill: 5    3  Gastroesophageal reflux disease with esophagitis without hemorrhage  4  Curtis's esophagus without dysplasia  Doing well on Protonix 40 mg daily  Last EGD February 2020  Due for follow-up 2023      Followup Appointment:  3 months  ______________________________________________________________________    Chief Complaint   Patient presents with    Abd  pain for past few weeks     HPI:  The patient is seen back in the office today  He has a history of GERD is doing relatively well taking Protonix 40 mg daily  More recently he has had issues with intermittent periumbilical and epigastric abdominal pain associated with bloat/gas and foul-smelling flatulence  He denies any rectal bleeding  His weight is relatively stable  Reports Zocor almost daily and last for several hours  Reports that he lays flat on the ground and relaxes it gets better  He can up put a finger on any particular food that affects it  He is moving his bowels regularly and the symptoms do not change with a bowel movement  He denies any upper GI symptoms  He does admit that he is under lot of stress with for small children at home    Historical Information   Past Medical History:   Diagnosis Date    Curtis's esophagus without dysplasia 4/14/2021    EGD 2019 with questionable high-grade dysplasia  Follow-up EGD February 2020 without evidence of dysplasia    Three year recall him    GERD (gastroesophageal reflux disease)     Hypertension      Past Surgical History:   Procedure Laterality Date    APPENDECTOMY      UPPER GASTROINTESTINAL ENDOSCOPY  11/14/2019    esophagitis, non erosive gastritis, +Curtis's esophagus wtih high grade dysplasia      Social History     Substance and Sexual Activity   Alcohol Use Not Currently    Alcohol/week: 0 0 standard drinks     Social History     Substance and Sexual Activity   Drug Use Never     Social History     Tobacco Use   Smoking Status Current Every Day Smoker    Packs/day: 1 50    Years: 15 00    Pack years: 22 50    Types: Cigarettes    Start date: 11/8/2001   Smokeless Tobacco Former User     Family History   Problem Relation Age of Onset    No Known Problems Mother     Heart disease Father     No Known Problems Brother     Colon cancer Neg Hx     Colon polyps Neg Hx          Current Outpatient Medications:     lisinopril (ZESTRIL) 20 mg tablet    pantoprazole (PROTONIX) 40 mg tablet    amitriptyline (ELAVIL) 10 mg tablet  Allergies   Allergen Reactions    Ibuprofen     Other Other (See Comments)     Seasonal allergies      Tetracycline      Reviewed medications and allergies and updated as indicated    PHYSICAL EXAM:    Blood pressure 130/88, pulse 100, height 5' 9" (1 753 m), weight 96 2 kg (212 lb)  Body mass index is 31 31 kg/m²  General Appearance: NAD, cooperative, alert  Eyes: Anicteric, PERRLA, EOMI  ENT:  Normocephalic, atraumatic, normal mucosa  Neck:  Supple, symmetrical, trachea midline  Resp:  Clear to auscultation bilaterally; no rales, rhonchi or wheezing; respirations unlabored   CV:  S1 S2, Regular rate and rhythm; no murmur, rub, or gallop  GI:  Soft, non-tender, non-distended; normal bowel sounds; no masses, no organomegaly   Rectal: Deferred  Musculoskeletal: No cyanosis, clubbing or edema  Normal ROM    Skin:  No jaundice, rashes, or lesions   Heme/Lymph: No palpable cervical lymphadenopathy  Psych: Normal affect, good eye contact  Neuro: No gross deficits, AAOx3    Lab Results:   Lab Results   Component Value Date    WBC 8 9 02/07/2020    HGB 15 5 02/07/2020    HCT 44 8 02/07/2020    MCV 93 02/07/2020     02/07/2020     Lab Results   Component Value Date    K 4 6 11/24/2021     11/24/2021    CO2 24 11/24/2021    BUN 18 11/24/2021    CREATININE 1 15 11/24/2021    AST 26 11/24/2021    ALT 18 11/24/2021       Lab Results   Component Value Date    LIPASE 45 02/07/2020       Radiology Results:   No results found

## 2022-03-15 NOTE — LETTER
March 15, 2022     Louie Sosa MD  [facility]    Patient: Lars Rodriguez   YOB: 1985   Date of Visit: 3/15/2022       Dear Dr Vinh Martinez: Thank you for referring Juan Helton to me for evaluation  Below are my notes for this consultation  If you have questions, please do not hesitate to call me  I look forward to following your patient along with you  Sincerely,        Nataliya Hollingsworth MD        CC: No Recipients  Nataliya Hollingsworth MD  3/15/2022  3:50 PM  Sign when Signing Visit  [id number] - Outpatient Follow-up Note  Lars Rordiguez 29 y o  male MRN: [id number]  Encounter: [id number]    ASSESSMENT AND PLAN:      1  Epigastric pain  2  Bloating  Persistent daily abdominal pain with bloating and foul-smelling flatulence  Previous workup has included ultrasound, EGD, and breath test   Suspect functional related  - CT abdomen w contrast; Future  - amitriptyline (ELAVIL) 10 mg tablet; Take 2 tablets (20 mg total) by mouth daily at bedtime  Dispense: 60 tablet; Refill: 5    3  Gastroesophageal reflux disease with esophagitis without hemorrhage  4  Curtis's esophagus without dysplasia  Doing well on Protonix 40 mg daily  Last EGD February 2020  Due for follow-up 2023      Followup Appointment:  3 months  ______________________________________________________________________    Chief Complaint   Patient presents with    Abd  pain for past few weeks     HPI:  The patient is seen back in the office today  He has a history of GERD is doing relatively well taking Protonix 40 mg daily  More recently he has had issues with intermittent periumbilical and epigastric abdominal pain associated with bloat/gas and foul-smelling flatulence  He denies any rectal bleeding  His weight is relatively stable  Reports Zocor almost daily and last for several hours    Reports that he lays flat on the ground and relaxes it gets better  He can up put a finger on any particular food that affects it  He is moving his bowels regularly and the symptoms do not change with a bowel movement  He denies any upper GI symptoms  He does admit that he is under lot of stress with for small children at home    Historical Information   Past Medical History:   Diagnosis Date    Curtis's esophagus without dysplasia 4/14/2021    EGD 2019 with questionable high-grade dysplasia  Follow-up EGD February 2020 without evidence of dysplasia  Three year recall him    GERD (gastroesophageal reflux disease)     Hypertension      Past Surgical History:   Procedure Laterality Date    APPENDECTOMY      UPPER GASTROINTESTINAL ENDOSCOPY  11/14/2019    esophagitis, non erosive gastritis, +Curtis's esophagus wtih high grade dysplasia      Social History     Substance and Sexual Activity   Alcohol Use Not Currently    Alcohol/week: 0 0 standard drinks     Social History     Substance and Sexual Activity   Drug Use Never     Social History     Tobacco Use   Smoking Status Current Every Day Smoker    Packs/day: 1 50    Years: 15 00    Pack years: 22 50    Types: Cigarettes    Start date: 11/8/2001   Smokeless Tobacco Former User     Family History   Problem Relation Age of Onset    No Known Problems Mother     Heart disease Father     No Known Problems Brother     Colon cancer Neg Hx     Colon polyps Neg Hx          Current Outpatient Medications:     lisinopril (ZESTRIL) 20 mg tablet    pantoprazole (PROTONIX) 40 mg tablet    amitriptyline (ELAVIL) 10 mg tablet  Allergies   Allergen Reactions    Ibuprofen     Other Other (See Comments)     Seasonal allergies      Tetracycline      Reviewed medications and allergies and updated as indicated    PHYSICAL EXAM:    Blood pressure 130/88, pulse 100, height 5' 9" (1 753 m), weight 96 2 kg (212 lb)  Body mass index is 31 31 kg/m²    General Appearance: NAD, cooperative, alert  Eyes: Anicteric, PERRLA, EOMI  ENT:  Normocephalic, atraumatic, normal mucosa  Neck:  Supple, symmetrical, trachea midline  Resp:  Clear to auscultation bilaterally; no rales, rhonchi or wheezing; respirations unlabored   CV:  S1 S2, Regular rate and rhythm; no murmur, rub, or gallop  GI:  Soft, non-tender, non-distended; normal bowel sounds; no masses, no organomegaly   Rectal: Deferred  Musculoskeletal: No cyanosis, clubbing or edema  Normal ROM  Skin:  No jaundice, rashes, or lesions   Heme/Lymph: No palpable cervical lymphadenopathy  Psych: Normal affect, good eye contact  Neuro: No gross deficits, AAOx3    Lab Results:   Lab Results   Component Value Date    WBC 8 9 02/07/2020    HGB 15 5 02/07/2020    HCT 44 8 02/07/2020    MCV 93 02/07/2020     02/07/2020     Lab Results   Component Value Date    K 4 6 11/24/2021     11/24/2021    CO2 24 11/24/2021    BUN 18 11/24/2021    CREATININE 1 15 11/24/2021    AST 26 11/24/2021    ALT 18 11/24/2021       Lab Results   Component Value Date    LIPASE 45 02/07/2020       Radiology Results:   No results found

## 2022-03-15 NOTE — PATIENT INSTRUCTIONS
Amitriptyline 10 mg at bedtime for 1 week and then increase to 20 mg at bedtime  Abdominal CT scan  Continue pantoprazole

## 2022-03-15 NOTE — TELEPHONE ENCOUNTER
Pt's wife, Randi Paula, left [facility] stating he is working/asked her to call; reports he has excruciating pain almost on a daily basis  # [phone]

## 2022-03-18 ENCOUNTER — TELEPHONE (OUTPATIENT)
Dept: GASTROENTEROLOGY | Facility: CLINIC | Age: 37
End: 2022-03-18

## 2022-03-18 NOTE — TELEPHONE ENCOUNTER
Pt states he wants a note put in his chart; was put on Amitriptyline 10 mg by [facility]; has a CDL/works for [employer]  He told employer he was put on the med; they may try to call and verify  If they call we can confirm med only/states they try to dig for info and violate HIPAA  Pt asks if he can spk directly to [facility]  CB# [phone]

## 2022-03-18 NOTE — TELEPHONE ENCOUNTER
I spoke with patient he is emphatic that he does not want any of his medical information released to his employer  He received paperwork from them requesting health information and he is not sure if they will be contacting our office also  I assured him we will not release any information without his consent

## 2022-04-06 ENCOUNTER — TELEPHONE (OUTPATIENT)
Dept: FAMILY MEDICINE CLINIC | Facility: CLINIC | Age: 37
End: 2022-04-06

## 2022-04-06 NOTE — TELEPHONE ENCOUNTER
Medication amitriptyline 10mg case pins and needle feeling in both hands and feet  Pt would like an MA or Dr to get back on weather this is a normal feeling or he should stop the medication?

## 2022-04-07 NOTE — TELEPHONE ENCOUNTER
Patient informed    Per patient he want to stop medication, couldn't scheduled appointment at the moment but will scheduled online

## 2022-04-08 ENCOUNTER — TELEPHONE (OUTPATIENT)
Dept: GASTROENTEROLOGY | Facility: CLINIC | Age: 37
End: 2022-04-08

## 2022-04-08 DIAGNOSIS — R14.0 BLOATING: ICD-10-CM

## 2022-04-08 DIAGNOSIS — Z01.812 PRE-PROCEDURE LAB EXAM: Primary | ICD-10-CM

## 2022-04-08 DIAGNOSIS — K76.0 HEPATIC STEATOSIS: ICD-10-CM

## 2022-04-08 DIAGNOSIS — R10.13 EPIGASTRIC PAIN: ICD-10-CM

## 2022-04-08 NOTE — TELEPHONE ENCOUNTER
Pts wife, Petra Bridges, states he has CT next Fri/was told he needs to get BW first   He will go to [employer]  CB# [phone]

## 2022-04-13 LAB
BUN SERPL-MCNC: 15 MG/DL (ref 6–20)
BUN/CREAT SERPL: 15 (ref 9–20)
CALCIUM SERPL-MCNC: 9.5 MG/DL (ref 8.7–10.2)
CHLORIDE SERPL-SCNC: 101 MMOL/L (ref 96–106)
CO2 SERPL-SCNC: 22 MMOL/L (ref 20–29)
CREAT SERPL-MCNC: 1.02 MG/DL (ref 0.76–1.27)
EGFR: 98 ML/MIN/1.73
GLUCOSE SERPL-MCNC: 121 MG/DL (ref 65–99)
POTASSIUM SERPL-SCNC: 4 MMOL/L (ref 3.5–5.2)
SODIUM SERPL-SCNC: 139 MMOL/L (ref 134–144)

## 2022-04-15 ENCOUNTER — HOSPITAL ENCOUNTER (OUTPATIENT)
Dept: CT IMAGING | Facility: HOSPITAL | Age: 37
Discharge: HOME | End: 2022-04-15
Attending: INTERNAL MEDICINE
Payer: COMMERCIAL

## 2022-04-15 DIAGNOSIS — R14.0 BLOATING: ICD-10-CM

## 2022-04-15 PROCEDURE — G1004 CDSM NDSC: HCPCS

## 2022-04-15 PROCEDURE — 74176 CT ABD & PELVIS W/O CONTRAST: CPT

## 2022-04-18 NOTE — TELEPHONE ENCOUNTER
Pt's wife, Hany Scott, left [facility] requesting  [phone] to go over results of CT scan done Fri night

## 2022-04-28 ENCOUNTER — TELEPHONE (OUTPATIENT)
Dept: GASTROENTEROLOGY | Facility: CLINIC | Age: 37
End: 2022-04-28

## 2022-04-28 NOTE — TELEPHONE ENCOUNTER
Pt left [facility] giving OK to release his medical forms to his company, [employer], for (sounds like) med [employer]  CB# [phone]  Ret'd call  Pt apparently has a form due May 11 for his employer to verify medication  He signed form today authorizing request  I noted as of this time we have not rec'd request  Pt will f/u w/ employer Tuesday

## 2022-05-02 NOTE — TELEPHONE ENCOUNTER
STEFAN completed  med form  Completed form fax'd to Ella Amy, [address] [place] [address] w/ attachments: OV 3/15/22; Lab results 4/12/22; CT Abd 4/15/22; US 3/13/21  Confirmation rec'd  Fax'd  med form to [address] [place] [address] Yari for scanning into Pt's chart

## 2022-06-10 ENCOUNTER — TELEPHONE (OUTPATIENT)
Dept: GASTROENTEROLOGY | Facility: CLINIC | Age: 37
End: 2022-06-10

## 2022-06-10 DIAGNOSIS — R14.0 BLOATING: ICD-10-CM

## 2022-06-10 NOTE — TELEPHONE ENCOUNTER
Patient scheduled for follow up 6/15/22  Last Rx was for 30 day with 5 refills  Will send for one time 90 day supply and meds will be addressed at upcoming visit

## 2022-06-10 NOTE — TELEPHONE ENCOUNTER
Yvonne Dickerson from [address] [place] [address] Pt gets 30-day supply of Amitriptyline and has refills  Insurance requests andrese to a 3-mo supply  OK to send new e-script or [phone]

## 2022-06-11 RX ORDER — AMITRIPTYLINE HYDROCHLORIDE 10 MG/1
20 TABLET, FILM COATED ORAL
Qty: 90 TABLET | Refills: 0 | Status: SHIPPED | OUTPATIENT
Start: 2022-06-11 | End: 2022-06-16

## 2022-06-16 ENCOUNTER — OFFICE VISIT (OUTPATIENT)
Dept: GASTROENTEROLOGY | Facility: CLINIC | Age: 37
End: 2022-06-16
Payer: COMMERCIAL

## 2022-06-16 VITALS
HEART RATE: 76 BPM | WEIGHT: 213 LBS | HEIGHT: 69 IN | SYSTOLIC BLOOD PRESSURE: 124 MMHG | BODY MASS INDEX: 31.55 KG/M2 | DIASTOLIC BLOOD PRESSURE: 88 MMHG

## 2022-06-16 DIAGNOSIS — R14.0 BLOATING: Primary | ICD-10-CM

## 2022-06-16 DIAGNOSIS — K21.00 GASTROESOPHAGEAL REFLUX DISEASE WITH ESOPHAGITIS WITHOUT HEMORRHAGE: ICD-10-CM

## 2022-06-16 DIAGNOSIS — R10.13 EPIGASTRIC PAIN: ICD-10-CM

## 2022-06-16 DIAGNOSIS — K22.70 BARRETT'S ESOPHAGUS WITHOUT DYSPLASIA: ICD-10-CM

## 2022-06-16 DIAGNOSIS — K21.00 GASTROESOPHAGEAL REFLUX DISEASE WITH ESOPHAGITIS: ICD-10-CM

## 2022-06-16 PROCEDURE — 4004F PT TOBACCO SCREEN RCVD TLK: CPT | Performed by: INTERNAL MEDICINE

## 2022-06-16 PROCEDURE — 3008F BODY MASS INDEX DOCD: CPT | Performed by: INTERNAL MEDICINE

## 2022-06-16 PROCEDURE — 99213 OFFICE O/P EST LOW 20 MIN: CPT | Performed by: INTERNAL MEDICINE

## 2022-06-16 RX ORDER — PANTOPRAZOLE SODIUM 40 MG/1
40 TABLET, DELAYED RELEASE ORAL DAILY
Qty: 90 TABLET | Refills: 3 | Status: SHIPPED | OUTPATIENT
Start: 2022-06-16 | End: 2022-09-14

## 2022-06-16 RX ORDER — AMITRIPTYLINE HYDROCHLORIDE 10 MG/1
30 TABLET, FILM COATED ORAL
Qty: 270 TABLET | Refills: 12 | Status: SHIPPED | OUTPATIENT
Start: 2022-06-16

## 2022-06-16 NOTE — PROGRESS NOTES
[id number] - Outpatient Follow-up Note  Bert Montenegro 29 y o  male MRN: [id number]  Encounter: [id number]    ASSESSMENT AND PLAN:      1  Bloating  2  Epigastric pain  Upper abdominal pain associated bloating  Workup has included ultrasound, EGD, breath test, and CT scan  Suspect all functional related  Doing better amitriptyline  Also does better when his weight is at increased    - amitriptyline (ELAVIL) 10 mg tablet; Take 3 tablets (30 mg total) by mouth daily at bedtime  Dispense: 270 tablet; Refill: 12  - watch diet and weight      3  Gastroesophageal reflux disease with esophagitis without hemorrhage  4  Curtis's esophagus without dysplasia  Doing well on Protonix daily  Last EGD February 2020  Due for follow-up 2023  - pantoprazole (PROTONIX) 40 mg tablet; Take 1 tablet (40 mg total) by mouth daily  Dispense: 90 tablet; Refill: 3      Followup Appointment:  6 months  ______________________________________________________________________    Chief Complaint   Patient presents with    Follow up-epigastric pain     HPI:  Patient is seen back in the office today  He has known history of GERD and Curtis's esophagus and has been doing well on Protonix 40 mg daily  He more recently has been evaluated for upper abdominal pain associated with bloating  Workup has included abdominal ultrasound, breath test, and CT scan  He was started on amitriptyline on last visit which has markedly improved his symptoms  He also reports that his weight gets too heavy he has symptoms but if he loses that weight he feels fine  Denies any dysphagia, odynophagia, early satiety  He is moving his bowels regularly  He was on amitriptyline 20 mg at bedtime but recently increased it to 30 mg and is feeling well    He denies any side effects with this medication    Historical Information   Past Medical History:   Diagnosis Date    Curtis's esophagus without dysplasia 4/14/2021    EGD 2019 with questionable high-grade dysplasia  Follow-up EGD February 2020 without evidence of dysplasia  Three year recall him    GERD (gastroesophageal reflux disease)     Hypertension      Past Surgical History:   Procedure Laterality Date    APPENDECTOMY      UPPER GASTROINTESTINAL ENDOSCOPY  11/14/2019    esophagitis, non erosive gastritis, +Curtis's esophagus wtih high grade dysplasia      Social History     Substance and Sexual Activity   Alcohol Use Not Currently    Alcohol/week: 0 0 standard drinks     Social History     Substance and Sexual Activity   Drug Use Never     Social History     Tobacco Use   Smoking Status Current Every Day Smoker    Packs/day: 1 50    Years: 15 00    Pack years: 22 50    Types: Cigarettes    Start date: 11/8/2001   Smokeless Tobacco Former User     Family History   Problem Relation Age of Onset    No Known Problems Mother     Heart disease Father     No Known Problems Brother     Colon cancer Neg Hx     Colon polyps Neg Hx          Current Outpatient Medications:     amitriptyline (ELAVIL) 10 mg tablet    lisinopril (ZESTRIL) 20 mg tablet    pantoprazole (PROTONIX) 40 mg tablet  Allergies   Allergen Reactions    Ibuprofen     Other Other (See Comments)     Seasonal allergies      Tetracycline      Reviewed medications and allergies and updated as indicated    PHYSICAL EXAM:    Blood pressure 124/88, pulse 76, height 5' 9" (1 753 m), weight 96 6 kg (213 lb)  Body mass index is 31 45 kg/m²  General Appearance: NAD, cooperative, alert  Eyes: Anicteric, PERRLA, EOMI  ENT:  Normocephalic, atraumatic, normal mucosa  Neck:  Supple, symmetrical, trachea midline  Resp:  Clear to auscultation bilaterally; no rales, rhonchi or wheezing; respirations unlabored   CV:  S1 S2, Regular rate and rhythm; no murmur, rub, or gallop    GI:  Soft, non-tender, non-distended; normal bowel sounds; no masses, no organomegaly   Rectal: Deferred  Musculoskeletal: No cyanosis, clubbing or edema  Normal ROM  Skin:  No jaundice, rashes, or lesions   Heme/Lymph: No palpable cervical lymphadenopathy  Psych: Normal affect, good eye contact  Neuro: No gross deficits, AAOx3    Lab Results:   Lab Results   Component Value Date    WBC 8 9 02/07/2020    HGB 15 5 02/07/2020    HCT 44 8 02/07/2020    MCV 93 02/07/2020     02/07/2020     Lab Results   Component Value Date    K 4 0 04/12/2022     04/12/2022    CO2 22 04/12/2022    BUN 15 04/12/2022    CREATININE 1 02 04/12/2022    AST 26 11/24/2021    ALT 18 11/24/2021    EGFR 98 04/12/2022       Lab Results   Component Value Date    LIPASE 45 02/07/2020       Radiology Results:   No results found

## 2022-06-16 NOTE — LETTER
June 16, 2022     Willetta Spatz, MD  [address] [address]    Patient: Gregor Lanes   YOB: 1985   Date of Visit: 6/16/2022       Dear Dr Mally Dia: Thank you for referring Lakeisha Hammond to me for evaluation  Below are my notes for this consultation  If you have questions, please do not hesitate to call me  I look forward to following your patient along with you  Sincerely,        Clare Herrera MD        CC: No Recipients  Clare Herrera MD  6/16/2022  3:55 PM  Sign when Signing Visit  [id number] - Outpatient Follow-up Note  Gregor Lanes 29 y o  male MRN: [id number]  Encounter: [id number]    ASSESSMENT AND PLAN:      1  Bloating  2  Epigastric pain  Upper abdominal pain associated bloating  Workup has included ultrasound, EGD, breath test, and CT scan  Suspect all functional related  Doing better amitriptyline  Also does better when his weight is at increased    - amitriptyline (ELAVIL) 10 mg tablet; Take 3 tablets (30 mg total) by mouth daily at bedtime  Dispense: 270 tablet; Refill: 12  - watch diet and weight      3  Gastroesophageal reflux disease with esophagitis without hemorrhage  4  Curtis's esophagus without dysplasia  Doing well on Protonix daily  Last EGD February 2020  Due for follow-up 2023  - pantoprazole (PROTONIX) 40 mg tablet; Take 1 tablet (40 mg total) by mouth daily  Dispense: 90 tablet; Refill: 3      Followup Appointment:  6 months  ______________________________________________________________________    Chief Complaint   Patient presents with    Follow up-epigastric pain     HPI:  Patient is seen back in the office today  He has known history of GERD and Curtis's esophagus and has been doing well on Protonix 40 mg daily  He more recently has been evaluated for upper abdominal pain associated with bloating    Workup has included abdominal ultrasound, breath test, and CT scan   He was started on amitriptyline on last visit which has markedly improved his symptoms  He also reports that his weight gets too heavy he has symptoms but if he loses that weight he feels fine  Denies any dysphagia, odynophagia, early satiety  He is moving his bowels regularly  He was on amitriptyline 20 mg at bedtime but recently increased it to 30 mg and is feeling well  He denies any side effects with this medication    Historical Information   Past Medical History:   Diagnosis Date    Curtis's esophagus without dysplasia 4/14/2021    EGD 2019 with questionable high-grade dysplasia  Follow-up EGD February 2020 without evidence of dysplasia    Three year recall him    GERD (gastroesophageal reflux disease)     Hypertension      Past Surgical History:   Procedure Laterality Date    APPENDECTOMY      UPPER GASTROINTESTINAL ENDOSCOPY  11/14/2019    esophagitis, non erosive gastritis, +Curtis's esophagus wtih high grade dysplasia      Social History     Substance and Sexual Activity   Alcohol Use Not Currently    Alcohol/week: 0 0 standard drinks     Social History     Substance and Sexual Activity   Drug Use Never     Social History     Tobacco Use   Smoking Status Current Every Day Smoker    Packs/day: 1 50    Years: 15 00    Pack years: 22 50    Types: Cigarettes    Start date: 11/8/2001   Smokeless Tobacco Former User     Family History   Problem Relation Age of Onset    No Known Problems Mother     Heart disease Father     No Known Problems Brother     Colon cancer Neg Hx     Colon polyps Neg Hx          Current Outpatient Medications:     amitriptyline (ELAVIL) 10 mg tablet    lisinopril (ZESTRIL) 20 mg tablet    pantoprazole (PROTONIX) 40 mg tablet  Allergies   Allergen Reactions    Ibuprofen     Other Other (See Comments)     Seasonal allergies      Tetracycline      Reviewed medications and allergies and updated as indicated    PHYSICAL EXAM:    Blood pressure 124/88, pulse 76, height 5' 9" (1 753 m), weight 96 6 kg (213 lb)  Body mass index is 31 45 kg/m²  General Appearance: NAD, cooperative, alert  Eyes: Anicteric, PERRLA, EOMI  ENT:  Normocephalic, atraumatic, normal mucosa  Neck:  Supple, symmetrical, trachea midline  Resp:  Clear to auscultation bilaterally; no rales, rhonchi or wheezing; respirations unlabored   CV:  S1 S2, Regular rate and rhythm; no murmur, rub, or gallop  GI:  Soft, non-tender, non-distended; normal bowel sounds; no masses, no organomegaly   Rectal: Deferred  Musculoskeletal: No cyanosis, clubbing or edema  Normal ROM  Skin:  No jaundice, rashes, or lesions   Heme/Lymph: No palpable cervical lymphadenopathy  Psych: Normal affect, good eye contact  Neuro: No gross deficits, AAOx3    Lab Results:   Lab Results   Component Value Date    WBC 8 9 02/07/2020    HGB 15 5 02/07/2020    HCT 44 8 02/07/2020    MCV 93 02/07/2020     02/07/2020     Lab Results   Component Value Date    K 4 0 04/12/2022     04/12/2022    CO2 22 04/12/2022    BUN 15 04/12/2022    CREATININE 1 02 04/12/2022    AST 26 11/24/2021    ALT 18 11/24/2021    EGFR 98 04/12/2022       Lab Results   Component Value Date    LIPASE 45 02/07/2020       Radiology Results:   No results found

## 2022-09-07 ENCOUNTER — OFFICE VISIT (OUTPATIENT)
Dept: FAMILY MEDICINE CLINIC | Facility: CLINIC | Age: 37
End: 2022-09-07
Payer: COMMERCIAL

## 2022-09-07 VITALS
DIASTOLIC BLOOD PRESSURE: 88 MMHG | SYSTOLIC BLOOD PRESSURE: 138 MMHG | HEART RATE: 90 BPM | BODY MASS INDEX: 31.99 KG/M2 | TEMPERATURE: 97.6 F | HEIGHT: 69 IN | OXYGEN SATURATION: 99 % | WEIGHT: 216 LBS

## 2022-09-07 DIAGNOSIS — R53.83 OTHER FATIGUE: ICD-10-CM

## 2022-09-07 DIAGNOSIS — E53.8 VITAMIN B12 DEFICIENCY: ICD-10-CM

## 2022-09-07 DIAGNOSIS — I10 PRIMARY HYPERTENSION: Primary | ICD-10-CM

## 2022-09-07 PROCEDURE — 99214 OFFICE O/P EST MOD 30 MIN: CPT | Performed by: FAMILY MEDICINE

## 2022-09-07 PROCEDURE — 3725F SCREEN DEPRESSION PERFORMED: CPT | Performed by: FAMILY MEDICINE

## 2022-09-07 NOTE — PROGRESS NOTES
Assessment/Plan:    No problem-specific Assessment & Plan notes found for this encounter  Diagnoses and all orders for this visit:    Primary hypertension  -     CBC and differential; Future  -     Comprehensive metabolic panel; Future  -     Lipid Panel with Direct LDL reflex; Future  -     T4, free; Future  -     TSH, 3rd generation with Free T4 reflex; Future  -     Vitamin B12; Future  -     CBC and differential  -     Comprehensive metabolic panel  -     Lipid Panel with Direct LDL reflex  -     T4, free  -     TSH, 3rd generation with Free T4 reflex  -     Vitamin B12    Vitamin B12 deficiency  -     Vitamin B12; Future  -     Vitamin B12    Other fatigue  -     T4, free; Future  -     TSH, 3rd generation with Free T4 reflex; Future  -     T4, free  -     TSH, 3rd generation with Free T4 reflex          Subjective:   Chief Complaint   Patient presents with   [address] DISCUSSION        Patient ID: Mitul Rosario is a 29 y o  male  HPI    The following portions of the patient's history were reviewed and updated as appropriate: allergies, current medications, past family history, past medical history, past social history, past surgical history and problem list     Review of Systems   Constitutional: Negative for fatigue, fever and unexpected weight change  HENT: Negative for congestion, sinus pain and sore throat  Eyes: Negative for visual disturbance  Respiratory: Negative for shortness of breath and wheezing  Cardiovascular: Negative for chest pain and palpitations  Gastrointestinal: Negative for abdominal pain, nausea and vomiting  Musculoskeletal: Negative  Negative for arthralgias and myalgias  Neurological: Negative for syncope, weakness and numbness  Psychiatric/Behavioral: Negative  Negative for confusion, dysphoric mood and suicidal ideas           Objective:  Vitals:    09/07/22 1513   BP: 138/88   BP Location: Left arm   Patient Position: Sitting   Cuff Size: Adult Pulse: 90   Temp: 97 6 °F (36 4 °C)   TempSrc: Tympanic   SpO2: 99%   Weight: 98 kg (216 lb)   Height: 5' 9" (1 753 m)      Physical Exam  Constitutional:       Appearance: He is well-developed  HENT:      Right Ear: Ear canal normal  Tympanic membrane is not injected  Left Ear: Ear canal normal  Tympanic membrane is not injected  Nose: Nose normal    Eyes:      General:         Right eye: No discharge  Left eye: No discharge  Conjunctiva/sclera: Conjunctivae normal       Pupils: Pupils are equal, round, and reactive to light  Neck:      Thyroid: No thyromegaly  Cardiovascular:      Rate and Rhythm: Normal rate and regular rhythm  Heart sounds: Normal heart sounds  No murmur heard  Pulmonary:      Effort: Pulmonary effort is normal  No respiratory distress  Breath sounds: Normal breath sounds  No wheezing  Abdominal:      General: Bowel sounds are normal  There is no distension  Palpations: Abdomen is soft  Tenderness: There is no abdominal tenderness  Musculoskeletal:         General: Normal range of motion  Cervical back: Normal range of motion and neck supple  Lymphadenopathy:      Cervical: No cervical adenopathy  Skin:     General: Skin is warm and dry  Neurological:      Mental Status: He is alert and oriented to person, place, and time  He is not disoriented  Sensory: No sensory deficit  Gait: Gait normal       Deep Tendon Reflexes: Reflexes are normal and symmetric  Psychiatric:         Speech: Speech normal          Behavior: Behavior normal          Thought Content:  Thought content normal          Judgment: Judgment normal

## 2022-12-18 DIAGNOSIS — I10 PRIMARY HYPERTENSION: ICD-10-CM

## 2022-12-18 RX ORDER — LISINOPRIL 20 MG/1
TABLET ORAL
Qty: 90 TABLET | Refills: 3 | Status: SHIPPED | OUTPATIENT
Start: 2022-12-18

## 2023-08-07 DIAGNOSIS — R14.0 BLOATING: ICD-10-CM

## 2023-08-07 RX ORDER — AMITRIPTYLINE HYDROCHLORIDE 10 MG/1
30 TABLET, FILM COATED ORAL
Qty: 270 TABLET | Refills: 12 | Status: SHIPPED | OUTPATIENT
Start: 2023-08-07

## 2023-08-17 DIAGNOSIS — K21.00 GASTROESOPHAGEAL REFLUX DISEASE WITH ESOPHAGITIS: ICD-10-CM

## 2023-08-17 RX ORDER — PANTOPRAZOLE SODIUM 40 MG/1
40 TABLET, DELAYED RELEASE ORAL DAILY
Qty: 30 TABLET | Refills: 0 | Status: SHIPPED | OUTPATIENT
Start: 2023-08-17 | End: 2023-09-11 | Stop reason: SDUPTHER

## 2023-09-11 ENCOUNTER — TELEPHONE (OUTPATIENT)
Dept: GASTROENTEROLOGY | Facility: CLINIC | Age: 38
End: 2023-09-11

## 2023-09-11 ENCOUNTER — OFFICE VISIT (OUTPATIENT)
Dept: GASTROENTEROLOGY | Facility: CLINIC | Age: 38
End: 2023-09-11
Payer: COMMERCIAL

## 2023-09-11 VITALS
SYSTOLIC BLOOD PRESSURE: 128 MMHG | WEIGHT: 226.4 LBS | BODY MASS INDEX: 33.53 KG/M2 | HEIGHT: 69 IN | DIASTOLIC BLOOD PRESSURE: 88 MMHG

## 2023-09-11 DIAGNOSIS — K21.00 GASTROESOPHAGEAL REFLUX DISEASE WITH ESOPHAGITIS: ICD-10-CM

## 2023-09-11 DIAGNOSIS — K22.70 BARRETT'S ESOPHAGUS WITHOUT DYSPLASIA: Primary | ICD-10-CM

## 2023-09-11 DIAGNOSIS — R14.0 BLOATING: ICD-10-CM

## 2023-09-11 PROBLEM — K30 FUNCTIONAL DYSPEPSIA: Status: ACTIVE | Noted: 2023-09-11

## 2023-09-11 PROCEDURE — 99213 OFFICE O/P EST LOW 20 MIN: CPT | Performed by: INTERNAL MEDICINE

## 2023-09-11 RX ORDER — PANTOPRAZOLE SODIUM 40 MG/1
40 TABLET, DELAYED RELEASE ORAL DAILY
Qty: 90 TABLET | Refills: 12 | Status: SHIPPED | OUTPATIENT
Start: 2023-09-11

## 2023-09-11 RX ORDER — AMITRIPTYLINE HYDROCHLORIDE 10 MG/1
30 TABLET, FILM COATED ORAL
Qty: 270 TABLET | Refills: 12 | Status: SHIPPED | OUTPATIENT
Start: 2023-09-11

## 2023-09-11 NOTE — TELEPHONE ENCOUNTER
Scheduled date of EGD(as of today): 12/1/2023  Physician performing EGD: Dr. Colin Banerjee  Location of EGD: [facility] ([facility])  Instructions reviewed with patient by: Gave pt instructions packet  Clearances: n/a

## 2023-09-11 NOTE — PROGRESS NOTES
[facility] - Outpatient Follow-up Note  Rom Glaser 40 y.o. male MRN: [id number]  Encounter: [id number]    ASSESSMENT AND PLAN:      1. Gastroesophageal reflux disease with esophagitis  Doing well taking Protonix once a day. No real breakthrough heartburn  - pantoprazole (PROTONIX) 40 mg tablet; Take 1 tablet (40 mg total) by mouth daily  Dispense: 90 tablet; Refill: 12    2. Curtis's esophagus without dysplasia  Last EGD February 2020. Due for follow-up  - EGD; Future    3. Bloating  Tender to functional dyspepsia. Previous work-up included EGD, breath test, CAT scan, and ultrasound. Doing well on amitriptyline 20 or 30 mg at bedtime  - amitriptyline (ELAVIL) 10 mg tablet; Take 3 tablets (30 mg total) by mouth daily at bedtime  Dispense: 270 tablet; Refill: 12      Followup Appointment: 1 year  ______________________________________________________________________    Chief Complaint   Patient presents with   • annual/ med refill     HPI: The patient is seen back in the office today. From a GI standpoint he is doing well from a GERD Curtis's standpoint he takes Protonix 40 mg daily. Denies any breakthrough heartburn. Denies any dysphagia, odynophagia, early satiety. Denies any nausea or vomiting. From an upper abdominal bloating and functional dyspepsia standpoint he continues take amitriptyline at night. He reports when his weight was down around 200 pounds he did not need it. Now that he is up around 220 pounds he takes anywhere from 20 to 30 mg at bedtime. This controls his symptoms. He feels well. He is moving his bowels regularly. Historical Information   Past Medical History:   Diagnosis Date   • Curtis's esophagus without dysplasia 4/14/2021    EGD 2019 with questionable high-grade dysplasia. Follow-up EGD February 2020 without evidence of dysplasia.   Three year recall him   • GERD (gastroesophageal reflux disease)    • Hypertension      Past Surgical History:   Procedure Laterality Date   • APPENDECTOMY     • UPPER GASTROINTESTINAL ENDOSCOPY  11/14/2019    esophagitis, non erosive gastritis, +Curtis's esophagus wtih high grade dysplasia      Social History     Substance and Sexual Activity   Alcohol Use Not Currently   • Alcohol/week: 0.0 standard drinks of alcohol     Social History     Substance and Sexual Activity   Drug Use Never     Social History     Tobacco Use   Smoking Status Every Day   • Packs/day: 1.50   • Years: 15.00   • Total pack years: 22.50   • Types: Cigarettes   • Start date: 11/8/2001   Smokeless Tobacco Former     Family History   Problem Relation Age of Onset   • No Known Problems Mother    • Heart disease Father    • No Known Problems Brother    • Colon cancer Neg Hx    • Colon polyps Neg Hx          Current Outpatient Medications:   •  amitriptyline (ELAVIL) 10 mg tablet  •  lisinopril (ZESTRIL) 20 mg tablet  •  pantoprazole (PROTONIX) 40 mg tablet  Allergies   Allergen Reactions   • Ibuprofen    • Other Other (See Comments)     Seasonal allergies     • Tetracycline      Reviewed medications and allergies and updated as indicated    PHYSICAL EXAM:    Blood pressure 128/88, height 5' 9" (1.753 m), weight 103 kg (226 lb 6.4 oz). Body mass index is 33.43 kg/m². General Appearance: NAD, cooperative, alert  Eyes: Anicteric, PERRLA, EOMI  ENT:  Normocephalic, atraumatic, normal mucosa. Neck:  Supple, symmetrical, trachea midline  Resp:  Clear to auscultation bilaterally; no rales, rhonchi or wheezing; respirations unlabored   CV:  S1 S2, Regular rate and rhythm; no murmur, rub, or gallop. GI:  Soft, non-tender, non-distended; normal bowel sounds; no masses, no organomegaly   Rectal: Deferred  Musculoskeletal: No cyanosis, clubbing or edema. Normal ROM.   Skin:  No jaundice, rashes, or lesions   Heme/Lymph: No palpable cervical lymphadenopathy  Psych: Normal affect, good eye contact  Neuro: No gross deficits, AAOx3    Lab Results: Lab Results   Component Value Date    WBC 8.9 02/07/2020    HGB 15.5 02/07/2020    HCT 44.8 02/07/2020    MCV 93 02/07/2020     02/07/2020     Lab Results   Component Value Date    K 4.0 04/12/2022     04/12/2022    CO2 22 04/12/2022    BUN 15 04/12/2022    CREATININE 1.02 04/12/2022    AST 26 11/24/2021    ALT 18 11/24/2021    EGFR 98 04/12/2022       Lab Results   Component Value Date    LIPASE 45 02/07/2020       Radiology Results:   No results found.

## 2023-09-12 ENCOUNTER — TELEPHONE (OUTPATIENT)
Age: 38
End: 2023-09-12

## 2023-09-12 NOTE — TELEPHONE ENCOUNTER
Patients wife called and stated with  good rx at [address] is only $6.00 and she asked if we can please send medication pantoprazole (90 day supply) and Amitriptyline to [facility] at [address] [facility], [place], [address] please reach out to let patient know when its done thank you

## 2023-09-12 NOTE — TELEPHONE ENCOUNTER
Prescription information called to [facility] and left on voicemail. Also left voicemail for patient rx called [facility].

## 2023-09-12 NOTE — TELEPHONE ENCOUNTER
Attempted PA on covermymeds for Pantoprazole  Key: [id number]    The medication requested is a plan exclusion and not a covered benefit under the [employer] plan.  Therefore, a Prior Authorization Review is not necessary or required     Routing to GI office

## 2023-09-12 NOTE — TELEPHONE ENCOUNTER
PA for pantoprazole could not be done via [employer]  Will proceed with CoverMeds  Pharmacy Benefits     [facility], [address] ([email or web address])    Covered:  Retail, Mail Order    Unknown:  Specialty, Long-Term Care  Member ID:  [id number]  Group ID:  [id number]  Group name:       Aurelia Levin:  [id number]  PCN:  Alexander Cuellar

## 2023-09-12 NOTE — TELEPHONE ENCOUNTER
Left message for patient that pantoprazole not covered. Gave him 2 options - one to check formulary list for covered PPI. The other option is to call [facility] as pantoprazole is on there generic list of meds covered without insurance.

## 2023-09-18 ENCOUNTER — TELEPHONE (OUTPATIENT)
Age: 38
End: 2023-09-18

## 2023-09-18 NOTE — TELEPHONE ENCOUNTER
Patients GI provider:  Dr. Trinity [facility]    Number to return call: [phone]    Reason for call: Patients spouse calling in regards to Amitriptyline. States it is not covered under patients insurance. Is asking if a prior authorization can be completed for the medication.      Scheduled procedure/appointment date if applicable: Apt 76/6/20

## 2023-09-20 DIAGNOSIS — I10 PRIMARY HYPERTENSION: ICD-10-CM

## 2023-09-22 RX ORDER — LISINOPRIL 20 MG/1
20 TABLET ORAL DAILY
Qty: 90 TABLET | Refills: 0 | Status: SHIPPED | OUTPATIENT
Start: 2023-09-22

## 2023-11-17 ENCOUNTER — ANESTHESIA (OUTPATIENT)
Dept: ANESTHESIOLOGY | Facility: AMBULATORY SURGERY CENTER | Age: 38
End: 2023-11-17

## 2023-11-17 ENCOUNTER — ANESTHESIA EVENT (OUTPATIENT)
Dept: ANESTHESIOLOGY | Facility: AMBULATORY SURGERY CENTER | Age: 38
End: 2023-11-17

## 2023-12-07 ENCOUNTER — TELEPHONE (OUTPATIENT)
Dept: FAMILY MEDICINE CLINIC | Facility: CLINIC | Age: 38
End: 2023-12-07

## 2023-12-07 DIAGNOSIS — R53.83 OTHER FATIGUE: ICD-10-CM

## 2023-12-07 DIAGNOSIS — E53.8 VITAMIN B12 DEFICIENCY: ICD-10-CM

## 2023-12-07 DIAGNOSIS — I10 PRIMARY HYPERTENSION: Primary | ICD-10-CM

## 2023-12-15 DIAGNOSIS — I10 PRIMARY HYPERTENSION: ICD-10-CM

## 2023-12-16 DIAGNOSIS — K21.00 GASTROESOPHAGEAL REFLUX DISEASE WITH ESOPHAGITIS: ICD-10-CM

## 2023-12-16 RX ORDER — LISINOPRIL 20 MG/1
20 TABLET ORAL DAILY
Qty: 90 TABLET | Refills: 0 | Status: SHIPPED | OUTPATIENT
Start: 2023-12-16

## 2023-12-18 RX ORDER — PANTOPRAZOLE SODIUM 40 MG/1
40 TABLET, DELAYED RELEASE ORAL DAILY
Qty: 90 TABLET | Refills: 1 | Status: SHIPPED | OUTPATIENT
Start: 2023-12-18

## 2024-01-05 ENCOUNTER — OFFICE VISIT (OUTPATIENT)
Dept: FAMILY MEDICINE CLINIC | Facility: CLINIC | Age: 39
End: 2024-01-05
Payer: COMMERCIAL

## 2024-01-05 VITALS
DIASTOLIC BLOOD PRESSURE: 74 MMHG | SYSTOLIC BLOOD PRESSURE: 122 MMHG | WEIGHT: 227 LBS | BODY MASS INDEX: 33.52 KG/M2 | HEART RATE: 72 BPM | TEMPERATURE: 97.6 F

## 2024-01-05 DIAGNOSIS — E78.2 MIXED HYPERLIPIDEMIA: ICD-10-CM

## 2024-01-05 DIAGNOSIS — K30 FUNCTIONAL DYSPEPSIA: ICD-10-CM

## 2024-01-05 DIAGNOSIS — Z00.00 WELLNESS EXAMINATION: ICD-10-CM

## 2024-01-05 DIAGNOSIS — K21.00 GASTROESOPHAGEAL REFLUX DISEASE WITH ESOPHAGITIS: ICD-10-CM

## 2024-01-05 DIAGNOSIS — R53.83 OTHER FATIGUE: ICD-10-CM

## 2024-01-05 DIAGNOSIS — I10 PRIMARY HYPERTENSION: ICD-10-CM

## 2024-01-05 DIAGNOSIS — R14.0 BLOATING: ICD-10-CM

## 2024-01-05 DIAGNOSIS — K22.70 BARRETT'S ESOPHAGUS WITHOUT DYSPLASIA: Primary | ICD-10-CM

## 2024-01-05 PROCEDURE — 99214 OFFICE O/P EST MOD 30 MIN: CPT | Performed by: FAMILY MEDICINE

## 2024-01-05 PROCEDURE — 99395 PREV VISIT EST AGE 18-39: CPT | Performed by: FAMILY MEDICINE

## 2024-01-05 RX ORDER — AMITRIPTYLINE HYDROCHLORIDE 10 MG/1
30 TABLET, FILM COATED ORAL
Qty: 270 TABLET | Refills: 3 | Status: SHIPPED | OUTPATIENT
Start: 2024-01-05

## 2024-01-05 RX ORDER — LISINOPRIL 20 MG/1
20 TABLET ORAL DAILY
Qty: 90 TABLET | Refills: 3 | Status: SHIPPED | OUTPATIENT
Start: 2024-01-05

## 2024-01-05 RX ORDER — PANTOPRAZOLE SODIUM 40 MG/1
40 TABLET, DELAYED RELEASE ORAL DAILY
Qty: 90 TABLET | Refills: 3 | Status: SHIPPED | OUTPATIENT
Start: 2024-01-05

## 2024-01-05 NOTE — PROGRESS NOTES
HPI:  Nikita Myers is a 38 y.o. male here for his yearly health maintenance exam.   Patient Active Problem List   Diagnosis    Epigastric pain    Periumbilical abdominal pain    Bloating    GERD (gastroesophageal reflux disease)    Curtis's esophagus without dysplasia    Hypertension    Functional dyspepsia    Mixed hyperlipidemia     Past Medical History:   Diagnosis Date    Curtis's esophagus without dysplasia 4/14/2021    EGD 2019 with questionable high-grade dysplasia.  Follow-up EGD February 2020 without evidence of dysplasia.  Three year recall him    GERD (gastroesophageal reflux disease)     Hypertension        1. Advanced Directive: n     2. Durable Power of  for Healthcare: n     3. Social History:           Drug and alcohol History: n                  4. Immunizations up to date: y                 Lifestyle:                           Healthy Diet:y                          Alcohol Use:n                          Tobacco Use:n                          Regular exercise:y                          Weight concerns:n                               5. Over the past 2 weeks, how often have you been bothered by the following:              Little interest or pleasure in doing things:n              Felling down, depressed or hopeless:n       Current Outpatient Medications   Medication Sig Dispense Refill    amitriptyline (ELAVIL) 10 mg tablet Take 3 tablets (30 mg total) by mouth daily at bedtime 270 tablet 12    lisinopril (ZESTRIL) 20 mg tablet take 1 tablet by mouth once daily 90 tablet 0    pantoprazole (PROTONIX) 40 mg tablet take 1 tablet by mouth once daily 90 tablet 1     No current facility-administered medications for this visit.     Allergies   Allergen Reactions    Ibuprofen     Other Other (See Comments)     Seasonal allergies      Tetracycline        There is no immunization history on file for this patient.    Patient Care Team:  Tristen Velez MD as PCP - General (Family  Medicine)    Review of Systems   Constitutional:  Negative for fatigue, fever and unexpected weight change.   HENT:  Negative for congestion, sinus pain and sore throat.    Eyes:  Negative for visual disturbance.   Respiratory:  Negative for shortness of breath and wheezing.    Cardiovascular:  Negative for chest pain and palpitations.   Gastrointestinal:  Negative for abdominal pain, nausea and vomiting.   Musculoskeletal: Negative.  Negative for arthralgias and myalgias.   Neurological:  Negative for syncope, weakness and numbness.   Psychiatric/Behavioral: Negative.  Negative for confusion, dysphoric mood and suicidal ideas.        Physical Exam :  Physical Exam  Constitutional:       Appearance: He is well-developed.   HENT:      Right Ear: Ear canal normal. Tympanic membrane is not injected.      Left Ear: Ear canal normal. Tympanic membrane is not injected.      Nose: Nose normal.   Eyes:      General:         Right eye: No discharge.         Left eye: No discharge.      Conjunctiva/sclera: Conjunctivae normal.      Pupils: Pupils are equal, round, and reactive to light.   Neck:      Thyroid: No thyromegaly.   Cardiovascular:      Rate and Rhythm: Normal rate and regular rhythm.      Heart sounds: Normal heart sounds. No murmur heard.  Pulmonary:      Effort: Pulmonary effort is normal. No respiratory distress.      Breath sounds: Normal breath sounds. No wheezing.   Abdominal:      General: Bowel sounds are normal. There is no distension.      Palpations: Abdomen is soft.      Tenderness: There is no abdominal tenderness.   Musculoskeletal:         General: Normal range of motion.      Cervical back: Normal range of motion and neck supple.   Lymphadenopathy:      Cervical: No cervical adenopathy.   Skin:     General: Skin is warm and dry.   Neurological:      Mental Status: He is alert and oriented to person, place, and time. He is not disoriented.      Sensory: No sensory deficit.      Motor: No weakness.       Coordination: Coordination normal.      Gait: Gait normal.      Deep Tendon Reflexes: Reflexes are normal and symmetric.   Psychiatric:         Speech: Speech normal.         Behavior: Behavior normal.         Thought Content: Thought content normal.         Judgment: Judgment normal.           Assessment and Plan:  1. Curtis's esophagus without dysplasia        2. Functional dyspepsia        3. Primary hypertension        4. Mixed hyperlipidemia        5. Wellness examination            Health Maintenance Due   Topic Date Due    Hepatitis C Screening  Never done    COVID-19 Vaccine (1) Never done    Pneumococcal Vaccine: Pediatrics (0 to 5 Years) and At-Risk Patients (6 to 64 Years) (1 - PCV) Never done    HIV Screening  Never done    DTaP,Tdap,and Td Vaccines (1 - Tdap) Never done    Influenza Vaccine (1) Never done

## 2024-01-05 NOTE — PROGRESS NOTES
Assessment/Plan:    No problem-specific Assessment & Plan notes found for this encounter.       Diagnoses and all orders for this visit:    Curtis's esophagus without dysplasia    Functional dyspepsia    Primary hypertension    Mixed hyperlipidemia    Wellness examination          Subjective:   Chief Complaint   Patient presents with    Physical Exam        Patient ID: Nikita Myers is a 38 y.o. male.    HPI    The following portions of the patient's history were reviewed and updated as appropriate: allergies, current medications, past family history, past medical history, past social history, past surgical history and problem list.    Review of Systems   Constitutional:  Negative for fatigue, fever and unexpected weight change.   HENT:  Negative for congestion, sinus pain and sore throat.    Eyes:  Negative for visual disturbance.   Respiratory:  Negative for shortness of breath and wheezing.    Cardiovascular:  Negative for chest pain and palpitations.   Gastrointestinal:  Negative for abdominal pain, nausea and vomiting.   Musculoskeletal: Negative.  Negative for arthralgias and myalgias.   Neurological:  Negative for syncope, weakness and numbness.   Psychiatric/Behavioral: Negative.  Negative for confusion, dysphoric mood and suicidal ideas.          Objective:  Vitals:    01/05/24 1006   BP: 122/74   BP Location: Left arm   Patient Position: Sitting   Cuff Size: Standard   Pulse: 72   Temp: 97.6 °F (36.4 °C)   TempSrc: Tympanic   Weight: 103 kg (227 lb)      Physical Exam  Constitutional:       Appearance: He is well-developed.   HENT:      Right Ear: Ear canal normal. Tympanic membrane is not injected.      Left Ear: Ear canal normal. Tympanic membrane is not injected.      Nose: Nose normal.   Eyes:      General:         Right eye: No discharge.         Left eye: No discharge.      Conjunctiva/sclera: Conjunctivae normal.      Pupils: Pupils are equal, round, and reactive to light.   Neck:       Thyroid: No thyromegaly.   Cardiovascular:      Rate and Rhythm: Normal rate and regular rhythm.      Heart sounds: Normal heart sounds. No murmur heard.  Pulmonary:      Effort: Pulmonary effort is normal. No respiratory distress.      Breath sounds: Normal breath sounds. No wheezing.   Abdominal:      General: Bowel sounds are normal. There is no distension.      Palpations: Abdomen is soft.      Tenderness: There is no abdominal tenderness.   Musculoskeletal:         General: Normal range of motion.      Cervical back: Normal range of motion and neck supple.   Lymphadenopathy:      Cervical: No cervical adenopathy.   Skin:     General: Skin is warm and dry.   Neurological:      Mental Status: He is alert and oriented to person, place, and time. He is not disoriented.      Sensory: No sensory deficit.      Motor: No weakness.      Coordination: Coordination normal.      Gait: Gait normal.      Deep Tendon Reflexes: Reflexes are normal and symmetric.   Psychiatric:         Speech: Speech normal.         Behavior: Behavior normal.         Thought Content: Thought content normal.         Judgment: Judgment normal.

## 2024-01-10 ENCOUNTER — TELEPHONE (OUTPATIENT)
Dept: GASTROENTEROLOGY | Facility: CLINIC | Age: 39
End: 2024-01-10

## 2024-01-24 ENCOUNTER — ANESTHESIA (OUTPATIENT)
Dept: ANESTHESIOLOGY | Facility: AMBULATORY SURGERY CENTER | Age: 39
End: 2024-01-24

## 2024-01-24 ENCOUNTER — ANESTHESIA EVENT (OUTPATIENT)
Dept: ANESTHESIOLOGY | Facility: AMBULATORY SURGERY CENTER | Age: 39
End: 2024-01-24

## 2024-02-05 ENCOUNTER — TELEPHONE (OUTPATIENT)
Dept: GASTROENTEROLOGY | Facility: AMBULATORY SURGERY CENTER | Age: 39
End: 2024-02-05

## 2024-02-05 ENCOUNTER — TELEPHONE (OUTPATIENT)
Dept: GASTROENTEROLOGY | Facility: CLINIC | Age: 39
End: 2024-02-05

## 2024-02-05 NOTE — TELEPHONE ENCOUNTER
No reply from Brittneyt  Procedure confirmed  Colonoscopy     Via: Spoke with patient.      Instructions given: Given to Patient at Visit     Prep Given: N/A    Call the office if there are any questions.

## 2024-02-07 ENCOUNTER — HOSPITAL ENCOUNTER (OUTPATIENT)
Dept: GASTROENTEROLOGY | Facility: AMBULATORY SURGERY CENTER | Age: 39
Discharge: HOME | End: 2024-02-07
Payer: COMMERCIAL

## 2024-02-07 ENCOUNTER — ANESTHESIA EVENT (OUTPATIENT)
Dept: GASTROENTEROLOGY | Facility: AMBULATORY SURGERY CENTER | Age: 39
End: 2024-02-07

## 2024-02-07 ENCOUNTER — ANESTHESIA (OUTPATIENT)
Dept: GASTROENTEROLOGY | Facility: AMBULATORY SURGERY CENTER | Age: 39
End: 2024-02-07

## 2024-02-07 VITALS
HEART RATE: 84 BPM | WEIGHT: 220 LBS | HEIGHT: 69 IN | RESPIRATION RATE: 18 BRPM | DIASTOLIC BLOOD PRESSURE: 87 MMHG | OXYGEN SATURATION: 95 % | BODY MASS INDEX: 32.58 KG/M2 | SYSTOLIC BLOOD PRESSURE: 136 MMHG | TEMPERATURE: 97.8 F

## 2024-02-07 DIAGNOSIS — K22.70 BARRETT'S ESOPHAGUS WITHOUT DYSPLASIA: ICD-10-CM

## 2024-02-07 PROCEDURE — 88342 IMHCHEM/IMCYTCHM 1ST ANTB: CPT | Performed by: PATHOLOGY

## 2024-02-07 PROCEDURE — 43239 EGD BIOPSY SINGLE/MULTIPLE: CPT | Performed by: INTERNAL MEDICINE

## 2024-02-07 PROCEDURE — 88305 TISSUE EXAM BY PATHOLOGIST: CPT | Performed by: PATHOLOGY

## 2024-02-07 PROCEDURE — 88341 IMHCHEM/IMCYTCHM EA ADD ANTB: CPT | Performed by: PATHOLOGY

## 2024-02-07 RX ORDER — SODIUM CHLORIDE, SODIUM LACTATE, POTASSIUM CHLORIDE, CALCIUM CHLORIDE 600; 310; 30; 20 MG/100ML; MG/100ML; MG/100ML; MG/100ML
20 INJECTION, SOLUTION INTRAVENOUS CONTINUOUS
Status: DISCONTINUED | OUTPATIENT
Start: 2024-02-07 | End: 2024-02-11 | Stop reason: HOSPADM

## 2024-02-07 RX ORDER — ONDANSETRON 2 MG/ML
4 INJECTION INTRAMUSCULAR; INTRAVENOUS ONCE AS NEEDED
Status: DISCONTINUED | OUTPATIENT
Start: 2024-02-07 | End: 2024-02-11 | Stop reason: HOSPADM

## 2024-02-07 RX ORDER — PROPOFOL 10 MG/ML
INJECTION, EMULSION INTRAVENOUS AS NEEDED
Status: DISCONTINUED | OUTPATIENT
Start: 2024-02-07 | End: 2024-02-07

## 2024-02-07 RX ORDER — SODIUM CHLORIDE, SODIUM LACTATE, POTASSIUM CHLORIDE, CALCIUM CHLORIDE 600; 310; 30; 20 MG/100ML; MG/100ML; MG/100ML; MG/100ML
50 INJECTION, SOLUTION INTRAVENOUS CONTINUOUS
Status: DISCONTINUED | OUTPATIENT
Start: 2024-02-07 | End: 2024-02-11 | Stop reason: HOSPADM

## 2024-02-07 RX ADMIN — SODIUM CHLORIDE, SODIUM LACTATE, POTASSIUM CHLORIDE, CALCIUM CHLORIDE 50 ML/HR: 600; 310; 30; 20 INJECTION, SOLUTION INTRAVENOUS at 09:58

## 2024-02-07 RX ADMIN — PROPOFOL 50 MG: 10 INJECTION, EMULSION INTRAVENOUS at 10:32

## 2024-02-07 RX ADMIN — PROPOFOL 50 MG: 10 INJECTION, EMULSION INTRAVENOUS at 10:25

## 2024-02-07 RX ADMIN — PROPOFOL 100 MG: 10 INJECTION, EMULSION INTRAVENOUS at 10:20

## 2024-02-07 RX ADMIN — PROPOFOL 50 MG: 10 INJECTION, EMULSION INTRAVENOUS at 10:28

## 2024-02-07 NOTE — ANESTHESIA POSTPROCEDURE EVALUATION
Post-Op Assessment Note    CV Status:  Stable  Pain Score: 0    Pain management: adequate       Mental Status:  Alert   Hydration Status:  Stable   PONV Controlled:  None   Airway Patency:  Patent     Post Op Vitals Reviewed: Yes    No anethesia notable event occurred.    Staff: CRNA               BP   199/100   Temp      Pulse  78   Resp   15   SpO2   100

## 2024-02-07 NOTE — H&P
History and Physical - [facility]  Nikita Myers 38 y.o. male MRN: [id number]    HPI: Nikita Myers is a 38 y.o. year old male who presents for EGD secondary to Curtis's esophagus    REVIEW OF SYSTEMS: Per the HPI, and otherwise unremarkable.    Historical Information   Past Medical History:   Diagnosis Date    Curtis's esophagus without dysplasia 4/14/2021    EGD 2019 with questionable high-grade dysplasia.  Follow-up EGD February 2020 without evidence of dysplasia.  Three year recall him    GERD (gastroesophageal reflux disease)     Hypertension      Past Surgical History:   Procedure Laterality Date    APPENDECTOMY      UPPER GASTROINTESTINAL ENDOSCOPY  11/14/2019    esophagitis, non erosive gastritis, +Curtis's esophagus wtih high grade dysplasia      Social History   Social History     Substance and Sexual Activity   Alcohol Use Not Currently    Alcohol/week: 0.0 standard drinks of alcohol     Social History     Substance and Sexual Activity   Drug Use Never     Social History     Tobacco Use   Smoking Status Every Day    Current packs/day: 1.50    Average packs/day: 1.5 packs/day for 22.2 years (33.4 ttl pk-yrs)    Types: Cigarettes    Start date: 11/8/2001   Smokeless Tobacco Never     Family History   Problem Relation Age of Onset    No Known Problems Mother     Heart disease Father     No Known Problems Brother     Colon cancer Neg Hx     Colon polyps Neg Hx        Meds/Allergies       Current Outpatient Medications:     amitriptyline (ELAVIL) 10 mg tablet    lisinopril (ZESTRIL) 20 mg tablet    pantoprazole (PROTONIX) 40 mg tablet    Current Facility-Administered Medications:     lactated ringers infusion, 50 mL/hr, Intravenous, Continuous, 50 mL/hr at 02/07/24 0958    Allergies   Allergen Reactions    Ibuprofen     Other Other (See Comments)     Seasonal allergies      Tetracycline        Objective     /95   Pulse 82   Temp 97.8 °F (36.6 °C) (Temporal)   Resp 20  "  Ht 5' 9\" (1.753 m)   Wt 99.8 kg (220 lb)   SpO2 100%   BMI 32.49 kg/m²     PHYSICAL EXAM    Gen: NAD AAOx3  Head: Normocephalic, Atraumatic  CV: S1S2 RRR no m/r/g  CHEST: Clear b/l no c/r/w  ABD: soft, +BS NT/ND  EXT: no edema    ASSESSMENT/PLAN:  This is a 38 y.o. year old male here for EGD secondary to Curtis's esophagus, and he is stable and optimized for his procedure.        "

## 2024-02-07 NOTE — ANESTHESIA PREPROCEDURE EVALUATION
Procedure:  EGD    Relevant Problems   CARDIO   (+) Hypertension   (+) Mixed hyperlipidemia      GI/HEPATIC   (+) GERD (gastroesophageal reflux disease)      Class II obesity  Physical Exam    Airway    Mallampati score: III  TM Distance: <3 FB  Neck ROM: full     Dental   Comment: None loose, No notable dental hx     Cardiovascular      Pulmonary      Other Findings        Anesthesia Plan  ASA Score- 2     Anesthesia Type- IV sedation with anesthesia with ASA Monitors.         Additional Monitors:     Airway Plan:     Comment: Npo after MN (sip of water with meds at 07:30)    Patient educated on the possibility for awareness under sedation and of the possibility of airway intervention in the event of an airway or procedural emergency  .       Plan Factors-Exercise tolerance (METS): >4 METS.    Chart reviewed.    Patient summary reviewed.    Patient is a current smoker.  Patient instructed to abstain from smoking on day of procedure. Patient smoked on day of surgery.            Induction- intravenous.    Postoperative Plan-     Informed Consent- Anesthetic plan and risks discussed with patient.  I personally reviewed this patient with the CRNA. Discussed and agreed on the Anesthesia Plan with the CRNA..

## 2024-02-13 PROCEDURE — 88342 IMHCHEM/IMCYTCHM 1ST ANTB: CPT | Performed by: PATHOLOGY

## 2024-02-13 PROCEDURE — 88341 IMHCHEM/IMCYTCHM EA ADD ANTB: CPT | Performed by: PATHOLOGY

## 2024-02-13 PROCEDURE — 88305 TISSUE EXAM BY PATHOLOGIST: CPT | Performed by: PATHOLOGY

## 2024-03-29 ENCOUNTER — TELEPHONE (OUTPATIENT)
Dept: FAMILY MEDICINE CLINIC | Facility: CLINIC | Age: 39
End: 2024-03-29

## 2024-03-29 LAB
ALBUMIN SERPL-MCNC: 4.7 G/DL (ref 4.1–5.1)
ALBUMIN/GLOB SERPL: 2.1 {RATIO} (ref 1.2–2.2)
ALP SERPL-CCNC: 53 IU/L (ref 44–121)
ALT SERPL-CCNC: 19 IU/L (ref 0–44)
AST SERPL-CCNC: 21 IU/L (ref 0–40)
BASOPHILS # BLD AUTO: 0.1 X10E3/UL (ref 0–0.2)
BASOPHILS NFR BLD AUTO: 1 %
BILIRUB SERPL-MCNC: 0.4 MG/DL (ref 0–1.2)
BUN SERPL-MCNC: 17 MG/DL (ref 6–20)
BUN/CREAT SERPL: 16 (ref 9–20)
CALCIUM SERPL-MCNC: 10 MG/DL (ref 8.7–10.2)
CHLORIDE SERPL-SCNC: 103 MMOL/L (ref 96–106)
CHOLEST SERPL-MCNC: 210 MG/DL (ref 100–199)
CO2 SERPL-SCNC: 23 MMOL/L (ref 20–29)
CREAT SERPL-MCNC: 1.06 MG/DL (ref 0.76–1.27)
EGFR: 93 ML/MIN/1.73
EOSINOPHIL # BLD AUTO: 0.3 X10E3/UL (ref 0–0.4)
EOSINOPHIL NFR BLD AUTO: 4 %
ERYTHROCYTE [DISTWIDTH] IN BLOOD BY AUTOMATED COUNT: 12.8 % (ref 11.6–15.4)
GLOBULIN SER-MCNC: 2.2 G/DL (ref 1.5–4.5)
GLUCOSE SERPL-MCNC: 99 MG/DL (ref 70–99)
HCT VFR BLD AUTO: 47 % (ref 37.5–51)
HDLC SERPL-MCNC: 36 MG/DL
HGB BLD-MCNC: 15.8 G/DL (ref 13–17.7)
IMM GRANULOCYTES # BLD: 0 X10E3/UL (ref 0–0.1)
IMM GRANULOCYTES NFR BLD: 0 %
LDLC SERPL CALC-MCNC: 134 MG/DL (ref 0–99)
LDLC/HDLC SERPL: 3.7 RATIO (ref 0–3.6)
LYMPHOCYTES # BLD AUTO: 3.6 X10E3/UL (ref 0.7–3.1)
LYMPHOCYTES NFR BLD AUTO: 48 %
MCH RBC QN AUTO: 31.5 PG (ref 26.6–33)
MCHC RBC AUTO-ENTMCNC: 33.6 G/DL (ref 31.5–35.7)
MCV RBC AUTO: 94 FL (ref 79–97)
MONOCYTES # BLD AUTO: 0.5 X10E3/UL (ref 0.1–0.9)
MONOCYTES NFR BLD AUTO: 6 %
NEUTROPHILS # BLD AUTO: 3.1 X10E3/UL (ref 1.4–7)
NEUTROPHILS NFR BLD AUTO: 41 %
PLATELET # BLD AUTO: 249 X10E3/UL (ref 150–450)
POTASSIUM SERPL-SCNC: 4.7 MMOL/L (ref 3.5–5.2)
PROT SERPL-MCNC: 6.9 G/DL (ref 6–8.5)
RBC # BLD AUTO: 5.02 X10E6/UL (ref 4.14–5.8)
SL AMB VLDL CHOLESTEROL CALC: 40 MG/DL (ref 5–40)
SODIUM SERPL-SCNC: 141 MMOL/L (ref 134–144)
T4 FREE SERPL-MCNC: 1.22 NG/DL (ref 0.82–1.77)
TRIGL SERPL-MCNC: 222 MG/DL (ref 0–149)
TSH SERPL DL<=0.005 MIU/L-ACNC: 1.47 UIU/ML (ref 0.45–4.5)
WBC # BLD AUTO: 7.5 X10E3/UL (ref 3.4–10.8)

## 2024-03-29 NOTE — TELEPHONE ENCOUNTER
----- Message from Tristen Velez MD sent at 3/29/2024  6:30 AM EDT -----  Lipids sl high---watch carbs---annual PE when due  ----- Message -----  From: Abel Samano Lab Results In  Sent: 3/29/2024   6:06 AM EDT  To: Tristen Velez MD

## 2024-11-13 ENCOUNTER — OFFICE VISIT (OUTPATIENT)
Dept: FAMILY MEDICINE CLINIC | Facility: CLINIC | Age: 39
End: 2024-11-13
Payer: COMMERCIAL

## 2024-11-13 VITALS — DIASTOLIC BLOOD PRESSURE: 78 MMHG | SYSTOLIC BLOOD PRESSURE: 136 MMHG

## 2024-11-13 DIAGNOSIS — H65.00 ACUTE SEROUS OTITIS MEDIA, RECURRENCE NOT SPECIFIED, UNSPECIFIED LATERALITY: Primary | ICD-10-CM

## 2024-11-13 PROCEDURE — 99214 OFFICE O/P EST MOD 30 MIN: CPT | Performed by: FAMILY MEDICINE

## 2024-11-13 RX ORDER — ACETAMINOPHEN AND CODEINE PHOSPHATE 300; 30 MG/1; MG/1
1 TABLET ORAL EVERY 4 HOURS PRN
Qty: 30 TABLET | Refills: 0 | Status: SHIPPED | OUTPATIENT
Start: 2024-11-13

## 2024-11-13 NOTE — PROGRESS NOTES
Assessment/Plan:    No problem-specific Assessment & Plan notes found for this encounter.       Diagnoses and all orders for this visit:    Acute serous otitis media, recurrence not specified, unspecified laterality  Comments:  severe pain---tyl w/ cod--augmentin  Orders:  -     amoxicillin-clavulanate (AUGMENTIN) 875-125 mg per tablet; Take 1 tablet by mouth every 12 (twelve) hours for 10 days  -     acetaminophen-codeine (TYLENOL with CODEINE #3) 300-30 MG per tablet; Take 1 tablet by mouth every 4 (four) hours as needed for moderate pain          Subjective:   Chief Complaint   Patient presents with    Earache     Sore throat        Patient ID: Nikita Myers is a 39 y.o. male.    Earache   Pertinent negatives include no abdominal pain, sore throat or vomiting.       The following portions of the patient's history were reviewed and updated as appropriate: allergies, current medications, past family history, past medical history, past social history, past surgical history and problem list.    Review of Systems   Constitutional:  Negative for fatigue, fever and unexpected weight change.   HENT:  Positive for ear pain. Negative for congestion, sinus pain and sore throat.    Eyes:  Negative for visual disturbance.   Respiratory:  Negative for shortness of breath and wheezing.    Cardiovascular:  Negative for chest pain and palpitations.   Gastrointestinal:  Negative for abdominal pain, nausea and vomiting.   Musculoskeletal: Negative.  Negative for arthralgias and myalgias.   Neurological:  Negative for syncope, weakness and numbness.   Psychiatric/Behavioral: Negative.  Negative for confusion, dysphoric mood and suicidal ideas.          Objective:  Vitals:    11/13/24 1531   BP: 136/78   BP Location: Left arm   Patient Position: Sitting   Cuff Size: Standard      Physical Exam  Constitutional:       Appearance: He is well-developed.   HENT:      Right Ear: Ear canal normal. Tympanic membrane is not injected.       Left Ear: Ear canal normal. Tympanic membrane is not injected.      Nose: Nose normal.   Eyes:      General:         Right eye: No discharge.         Left eye: No discharge.      Conjunctiva/sclera: Conjunctivae normal.      Pupils: Pupils are equal, round, and reactive to light.   Neck:      Thyroid: No thyromegaly.   Cardiovascular:      Rate and Rhythm: Normal rate and regular rhythm.      Heart sounds: Normal heart sounds. No murmur heard.  Pulmonary:      Effort: Pulmonary effort is normal. No respiratory distress.      Breath sounds: Normal breath sounds. No wheezing.   Abdominal:      General: Bowel sounds are normal. There is no distension.      Palpations: Abdomen is soft.      Tenderness: There is no abdominal tenderness.   Musculoskeletal:         General: Normal range of motion.      Cervical back: Normal range of motion and neck supple.   Lymphadenopathy:      Cervical: No cervical adenopathy.   Skin:     General: Skin is warm and dry.   Neurological:      Mental Status: He is alert and oriented to person, place, and time. He is not disoriented.      Sensory: No sensory deficit.      Motor: No weakness.      Coordination: Coordination normal.      Gait: Gait normal.      Deep Tendon Reflexes: Reflexes are normal and symmetric.   Psychiatric:         Speech: Speech normal.         Behavior: Behavior normal.         Thought Content: Thought content normal.         Judgment: Judgment normal.       L ear +++otitis media

## 2024-12-18 DIAGNOSIS — K21.00 GASTROESOPHAGEAL REFLUX DISEASE WITH ESOPHAGITIS: ICD-10-CM

## 2024-12-19 RX ORDER — PANTOPRAZOLE SODIUM 40 MG/1
40 TABLET, DELAYED RELEASE ORAL DAILY
Qty: 90 TABLET | Refills: 1 | Status: SHIPPED | OUTPATIENT
Start: 2024-12-19

## 2025-03-14 DIAGNOSIS — I10 PRIMARY HYPERTENSION: ICD-10-CM

## 2025-03-14 RX ORDER — LISINOPRIL 20 MG/1
20 TABLET ORAL DAILY
Qty: 90 TABLET | Refills: 1 | Status: SHIPPED | OUTPATIENT
Start: 2025-03-14

## 2025-03-17 ENCOUNTER — TELEPHONE (OUTPATIENT)
Age: 40
End: 2025-03-17

## 2025-03-17 DIAGNOSIS — Z00.00 WELLNESS EXAMINATION: ICD-10-CM

## 2025-03-17 DIAGNOSIS — I10 PRIMARY HYPERTENSION: ICD-10-CM

## 2025-03-17 DIAGNOSIS — E78.2 MIXED HYPERLIPIDEMIA: Primary | ICD-10-CM

## 2025-03-17 NOTE — TELEPHONE ENCOUNTER
Medication: lisinopril (ZESTRIL) 20 mg tablet     Dose/Frequency: take 1 tablet by mouth once daily     Quantity: 90    Pharmacy: [id number] - [place] - [id number] [facility]     Office:   [x] PCP/Provider - Tristen Velez MD   [] Speciality/Provider -     Does the patient have enough for 3 days?   [x] Yes   [] No - Send as HP to POD

## 2025-03-17 NOTE — TELEPHONE ENCOUNTER
Nikita would like to know if labs could be send over to be completed before his next appointment. We switched his appointment to a physical. New date 3/26/25. He would like it to be send to [facility].

## 2025-03-18 RX ORDER — LISINOPRIL 20 MG/1
20 TABLET ORAL DAILY
Qty: 90 TABLET | Refills: 1 | OUTPATIENT
Start: 2025-03-18

## 2025-03-18 NOTE — TELEPHONE ENCOUNTER
lisinopril (ZESTRIL) 20 mg tablet , was sent to the [facility] #[id number] - [place]  on 3/14/25  with a qty of 90 tablets with 1 refills

## 2025-03-26 ENCOUNTER — OFFICE VISIT (OUTPATIENT)
Dept: FAMILY MEDICINE CLINIC | Facility: CLINIC | Age: 40
End: 2025-03-26

## 2025-03-26 ENCOUNTER — RESULTS FOLLOW-UP (OUTPATIENT)
Dept: FAMILY MEDICINE CLINIC | Facility: CLINIC | Age: 40
End: 2025-03-26

## 2025-03-26 VITALS
OXYGEN SATURATION: 100 % | BODY MASS INDEX: 32.92 KG/M2 | SYSTOLIC BLOOD PRESSURE: 138 MMHG | TEMPERATURE: 96.9 F | HEIGHT: 69 IN | WEIGHT: 222.3 LBS | HEART RATE: 95 BPM | DIASTOLIC BLOOD PRESSURE: 80 MMHG

## 2025-03-26 DIAGNOSIS — Z00.00 WELLNESS EXAMINATION: ICD-10-CM

## 2025-03-26 DIAGNOSIS — E78.2 MIXED HYPERLIPIDEMIA: ICD-10-CM

## 2025-03-26 DIAGNOSIS — N52.01 ERECTILE DYSFUNCTION DUE TO ARTERIAL INSUFFICIENCY: ICD-10-CM

## 2025-03-26 DIAGNOSIS — I10 PRIMARY HYPERTENSION: Primary | ICD-10-CM

## 2025-03-26 LAB
ALBUMIN SERPL-MCNC: 4.7 G/DL (ref 4.1–5.1)
ALP SERPL-CCNC: 54 IU/L (ref 44–121)
ALT SERPL-CCNC: 23 IU/L (ref 0–44)
AST SERPL-CCNC: 25 IU/L (ref 0–40)
BASOPHILS # BLD AUTO: 0.1 X10E3/UL (ref 0–0.2)
BASOPHILS NFR BLD AUTO: 1 %
BILIRUB SERPL-MCNC: 0.3 MG/DL (ref 0–1.2)
BUN SERPL-MCNC: 17 MG/DL (ref 6–20)
BUN/CREAT SERPL: 17 (ref 9–20)
CALCIUM SERPL-MCNC: 9.6 MG/DL (ref 8.7–10.2)
CHLORIDE SERPL-SCNC: 101 MMOL/L (ref 96–106)
CHOLEST SERPL-MCNC: 223 MG/DL (ref 100–199)
CO2 SERPL-SCNC: 22 MMOL/L (ref 20–29)
CREAT SERPL-MCNC: 1.01 MG/DL (ref 0.76–1.27)
EGFR: 98 ML/MIN/1.73
EOSINOPHIL # BLD AUTO: 0.2 X10E3/UL (ref 0–0.4)
EOSINOPHIL NFR BLD AUTO: 3 %
ERYTHROCYTE [DISTWIDTH] IN BLOOD BY AUTOMATED COUNT: 12.2 % (ref 11.6–15.4)
GLOBULIN SER-MCNC: 2.1 G/DL (ref 1.5–4.5)
GLUCOSE SERPL-MCNC: 101 MG/DL (ref 70–99)
HCT VFR BLD AUTO: 47.2 % (ref 37.5–51)
HDLC SERPL-MCNC: 39 MG/DL
HGB BLD-MCNC: 15.9 G/DL (ref 13–17.7)
IMM GRANULOCYTES # BLD: 0 X10E3/UL (ref 0–0.1)
IMM GRANULOCYTES NFR BLD: 0 %
LDLC SERPL CALC-MCNC: 140 MG/DL (ref 0–99)
LDLC/HDLC SERPL: 3.6 RATIO (ref 0–3.6)
LYMPHOCYTES # BLD AUTO: 4.1 X10E3/UL (ref 0.7–3.1)
LYMPHOCYTES NFR BLD AUTO: 50 %
MCH RBC QN AUTO: 31.7 PG (ref 26.6–33)
MCHC RBC AUTO-ENTMCNC: 33.7 G/DL (ref 31.5–35.7)
MCV RBC AUTO: 94 FL (ref 79–97)
MONOCYTES # BLD AUTO: 0.6 X10E3/UL (ref 0.1–0.9)
MONOCYTES NFR BLD AUTO: 7 %
NEUTROPHILS # BLD AUTO: 3.1 X10E3/UL (ref 1.4–7)
NEUTROPHILS NFR BLD AUTO: 39 %
PLATELET # BLD AUTO: 241 X10E3/UL (ref 150–450)
POTASSIUM SERPL-SCNC: 4.5 MMOL/L (ref 3.5–5.2)
PROT SERPL-MCNC: 6.8 G/DL (ref 6–8.5)
RBC # BLD AUTO: 5.01 X10E6/UL (ref 4.14–5.8)
SL AMB VLDL CHOLESTEROL CALC: 44 MG/DL (ref 5–40)
SODIUM SERPL-SCNC: 139 MMOL/L (ref 134–144)
TRIGL SERPL-MCNC: 241 MG/DL (ref 0–149)
WBC # BLD AUTO: 8 X10E3/UL (ref 3.4–10.8)

## 2025-03-26 RX ORDER — TADALAFIL 20 MG/1
20 TABLET ORAL DAILY PRN
Qty: 30 TABLET | Refills: 5 | Status: SHIPPED | OUTPATIENT
Start: 2025-03-26

## 2025-03-26 RX ORDER — LISINOPRIL 20 MG/1
20 TABLET ORAL DAILY
Qty: 90 TABLET | Refills: 3 | Status: SHIPPED | OUTPATIENT
Start: 2025-03-26 | End: 2025-03-26

## 2025-03-26 RX ORDER — LISINOPRIL 20 MG/1
20 TABLET ORAL DAILY
Qty: 90 TABLET | Refills: 3 | Status: SHIPPED | OUTPATIENT
Start: 2025-03-26

## 2025-03-26 NOTE — PROGRESS NOTES
Adult Annual Physical  Name: Nikita Myers      : 1985      MRN: [id number]  Encounter Provider: Tristen Velez MD  Encounter Date: 3/26/2025   Encounter department: [facility]    Assessment & Plan  Primary hypertension  Stable on current medication regimen. Patient to continue prescribed medication.            Mixed hyperlipidemia  Stable on current medication regimen. Patient to continue prescribed medication.            Wellness examination         Preventive Screenings:    - Prostate cancer screening: screening not indicated     Immunizations:  - Immunizations due: Influenza, Prevnar 20 and Tdap         History of Present Illness     Adult Annual Physical:  Patient presents for annual physical.     Diet and Physical Activity:  - Diet/Nutrition: no special diet.  - Exercise: walking, moderate cardiovascular exercise and 3-4 times a week on average.    General Health:  - Sleep: sleeps well.  - Hearing: normal hearing right ear, normal hearing left ear and normal hearing bilateral ears.  - Vision: no vision problems.  - Dental: regular dental visits, brushes teeth twice daily and floss regularly.    /GYN Health:  - Follows with GYN: no.   - History of STDs: no     Health:  - History of STDs: no.   - Urinary symptoms: none.     Advanced Care Planning:  - Has an advanced directive?: no    - Has a durable medical POA?: no      Review of Systems   Constitutional:  Negative for fatigue, fever and unexpected weight change.   HENT:  Negative for congestion, sinus pain and sore throat.    Eyes:  Negative for visual disturbance.   Respiratory:  Negative for shortness of breath and wheezing.    Cardiovascular:  Negative for chest pain and palpitations.   Gastrointestinal:  Negative for abdominal pain, nausea and vomiting.   Musculoskeletal: Negative.  Negative for arthralgias and myalgias.   Neurological:  Negative for syncope, weakness and numbness.  "  Psychiatric/Behavioral: Negative.  Negative for confusion, dysphoric mood and suicidal ideas.          Objective   /80 (BP Location: Left arm, Patient Position: Sitting, Cuff Size: Standard)   Pulse 95   Temp (!) 96.9 °F (36.1 °C) (Tympanic)   Ht 5' 9\" (1.753 m)   Wt 101 kg (222 lb 4.8 oz)   SpO2 100%   BMI 32.83 kg/m²     Physical Exam  Vitals and nursing note reviewed.   Constitutional:       General: He is not in acute distress.     Appearance: He is well-developed.   HENT:      Head: Normocephalic and atraumatic.   Eyes:      Conjunctiva/sclera: Conjunctivae normal.   Cardiovascular:      Rate and Rhythm: Normal rate and regular rhythm.      Heart sounds: No murmur heard.  Pulmonary:      Effort: Pulmonary effort is normal. No respiratory distress.      Breath sounds: Normal breath sounds.   Abdominal:      Palpations: Abdomen is soft.      Tenderness: There is no abdominal tenderness.   Musculoskeletal:         General: No swelling.      Cervical back: Neck supple.   Skin:     General: Skin is warm and dry.      Capillary Refill: Capillary refill takes less than 2 seconds.   Neurological:      Mental Status: He is alert.   Psychiatric:         Mood and Affect: Mood normal.         "

## 2025-06-14 DIAGNOSIS — K21.00 GASTROESOPHAGEAL REFLUX DISEASE WITH ESOPHAGITIS: ICD-10-CM

## 2025-06-14 RX ORDER — PANTOPRAZOLE SODIUM 40 MG/1
40 TABLET, DELAYED RELEASE ORAL DAILY
Qty: 90 TABLET | Refills: 1 | Status: SHIPPED | OUTPATIENT
Start: 2025-06-14

## 2025-08-06 ENCOUNTER — TELEPHONE (OUTPATIENT)
Age: 40
End: 2025-08-06